# Patient Record
Sex: MALE | Race: WHITE | NOT HISPANIC OR LATINO | Employment: FULL TIME | URBAN - METROPOLITAN AREA
[De-identification: names, ages, dates, MRNs, and addresses within clinical notes are randomized per-mention and may not be internally consistent; named-entity substitution may affect disease eponyms.]

---

## 2017-03-10 PROBLEM — R13.14 DYSPHAGIA, PHARYNGOESOPHAGEAL: Status: ACTIVE | Noted: 2017-03-10

## 2022-02-11 PROBLEM — K61.1 PERIRECTAL ABSCESS: Status: ACTIVE | Noted: 2022-02-11

## 2022-04-18 PROBLEM — K60.3 PERIANAL FISTULA: Status: ACTIVE | Noted: 2022-04-18

## 2022-06-23 ENCOUNTER — TELEPHONE (OUTPATIENT)
Dept: SURGERY | Facility: CLINIC | Age: 62
End: 2022-06-23
Payer: COMMERCIAL

## 2022-06-24 ENCOUNTER — OFFICE VISIT (OUTPATIENT)
Dept: SURGERY | Facility: CLINIC | Age: 62
End: 2022-06-24
Payer: COMMERCIAL

## 2022-06-24 VITALS
SYSTOLIC BLOOD PRESSURE: 132 MMHG | BODY MASS INDEX: 30.97 KG/M2 | DIASTOLIC BLOOD PRESSURE: 76 MMHG | HEIGHT: 67 IN | WEIGHT: 197.31 LBS | HEART RATE: 68 BPM

## 2022-06-24 DIAGNOSIS — K60.3 ANAL FISTULA: Primary | ICD-10-CM

## 2022-06-24 PROCEDURE — 3008F BODY MASS INDEX DOCD: CPT | Mod: CPTII,S$GLB,, | Performed by: COLON & RECTAL SURGERY

## 2022-06-24 PROCEDURE — 3078F PR MOST RECENT DIASTOLIC BLOOD PRESSURE < 80 MM HG: ICD-10-PCS | Mod: CPTII,S$GLB,, | Performed by: COLON & RECTAL SURGERY

## 2022-06-24 PROCEDURE — 4010F ACE/ARB THERAPY RXD/TAKEN: CPT | Mod: CPTII,S$GLB,, | Performed by: COLON & RECTAL SURGERY

## 2022-06-24 PROCEDURE — 99204 PR OFFICE/OUTPT VISIT, NEW, LEVL IV, 45-59 MIN: ICD-10-PCS | Mod: S$GLB,,, | Performed by: COLON & RECTAL SURGERY

## 2022-06-24 PROCEDURE — 3075F PR MOST RECENT SYSTOLIC BLOOD PRESS GE 130-139MM HG: ICD-10-PCS | Mod: CPTII,S$GLB,, | Performed by: COLON & RECTAL SURGERY

## 2022-06-24 PROCEDURE — 1160F PR REVIEW ALL MEDS BY PRESCRIBER/CLIN PHARMACIST DOCUMENTED: ICD-10-PCS | Mod: CPTII,S$GLB,, | Performed by: COLON & RECTAL SURGERY

## 2022-06-24 PROCEDURE — 99999 PR PBB SHADOW E&M-EST. PATIENT-LVL V: ICD-10-PCS | Mod: PBBFAC,,, | Performed by: COLON & RECTAL SURGERY

## 2022-06-24 PROCEDURE — 3078F DIAST BP <80 MM HG: CPT | Mod: CPTII,S$GLB,, | Performed by: COLON & RECTAL SURGERY

## 2022-06-24 PROCEDURE — 1160F RVW MEDS BY RX/DR IN RCRD: CPT | Mod: CPTII,S$GLB,, | Performed by: COLON & RECTAL SURGERY

## 2022-06-24 PROCEDURE — 3075F SYST BP GE 130 - 139MM HG: CPT | Mod: CPTII,S$GLB,, | Performed by: COLON & RECTAL SURGERY

## 2022-06-24 PROCEDURE — 1159F MED LIST DOCD IN RCRD: CPT | Mod: CPTII,S$GLB,, | Performed by: COLON & RECTAL SURGERY

## 2022-06-24 PROCEDURE — 99204 OFFICE O/P NEW MOD 45 MIN: CPT | Mod: S$GLB,,, | Performed by: COLON & RECTAL SURGERY

## 2022-06-24 PROCEDURE — 4010F PR ACE/ARB THEARPY RXD/TAKEN: ICD-10-PCS | Mod: CPTII,S$GLB,, | Performed by: COLON & RECTAL SURGERY

## 2022-06-24 PROCEDURE — 1159F PR MEDICATION LIST DOCUMENTED IN MEDICAL RECORD: ICD-10-PCS | Mod: CPTII,S$GLB,, | Performed by: COLON & RECTAL SURGERY

## 2022-06-24 PROCEDURE — 3008F PR BODY MASS INDEX (BMI) DOCUMENTED: ICD-10-PCS | Mod: CPTII,S$GLB,, | Performed by: COLON & RECTAL SURGERY

## 2022-06-24 PROCEDURE — 99999 PR PBB SHADOW E&M-EST. PATIENT-LVL V: CPT | Mod: PBBFAC,,, | Performed by: COLON & RECTAL SURGERY

## 2022-06-24 RX ORDER — CIPROFLOXACIN 500 MG/1
500 TABLET ORAL 2 TIMES DAILY
Qty: 14 TABLET | Refills: 0 | Status: SHIPPED | OUTPATIENT
Start: 2022-06-24 | End: 2022-07-01

## 2022-06-24 RX ORDER — METRONIDAZOLE 500 MG/1
500 TABLET ORAL EVERY 8 HOURS
Qty: 21 TABLET | Refills: 0 | Status: SHIPPED | OUTPATIENT
Start: 2022-06-24 | End: 2022-07-01

## 2022-06-24 RX ORDER — OXYCODONE AND ACETAMINOPHEN 5; 325 MG/1; MG/1
1 TABLET ORAL EVERY 4 HOURS PRN
Qty: 20 TABLET | Refills: 0 | Status: ON HOLD | OUTPATIENT
Start: 2022-06-24 | End: 2022-12-21 | Stop reason: HOSPADM

## 2022-06-24 NOTE — PROGRESS NOTES
Subjective:       Patient ID: Jimmy Shaikh is a 61 y.o. male.    Chief Complaint: Anal Fistula    HPI New pt. S/p rectal mucosal advancement flap and 3 column Ligasure hemorrhoidecomy - May 25, 2022.   I and D Feb 2022, Seton April 2022.   In the past 2 weeks has had worsening pain and drainage. Csope last year - deiverticulosis.     Review of Systems   Constitutional: Negative for chills and fever.   Respiratory: Negative for cough and shortness of breath.    Cardiovascular: Negative for chest pain and palpitations.   Gastrointestinal: Negative for nausea and vomiting.   Genitourinary: Negative for dysuria and urgency.   Neurological: Negative for seizures and numbness.         Objective:      Physical Exam  Constitutional:       Appearance: He is well-developed.   Eyes:      Conjunctiva/sclera: Conjunctivae normal.   Pulmonary:      Effort: Pulmonary effort is normal. No respiratory distress.   Genitourinary:     Comments:   Left lateral external opening with palpable tract to posterior midline.  Tolerated KENISHA.    Musculoskeletal:         General: Normal range of motion.   Skin:     General: Skin is warm and dry.   Neurological:      Mental Status: He is alert and oriented to person, place, and time.         Reviewed CT and operative reports.         Assessment:       Problem List Items Addressed This Visit    None     Visit Diagnoses     Anal fistula    -  Primary    Relevant Medications    oxyCODONE-acetaminophen (PERCOCET) 5-325 mg per tablet    ciprofloxacin HCl (CIPRO) 500 MG tablet    metroNIDAZOLE (FLAGYL) 500 MG tablet    Other Relevant Orders    Case Request Operating Room: PLACEMENT, SETON STITCH (Completed)          Plan:       Exam under anesthesia. Seton Placement.  Oral antibiotics.   Plan will be for seton for extended time period.

## 2022-06-28 ENCOUNTER — ANESTHESIA EVENT (OUTPATIENT)
Dept: SURGERY | Facility: HOSPITAL | Age: 62
End: 2022-06-28
Payer: COMMERCIAL

## 2022-06-28 ENCOUNTER — TELEPHONE (OUTPATIENT)
Dept: SURGERY | Facility: CLINIC | Age: 62
End: 2022-06-28
Payer: COMMERCIAL

## 2022-06-28 NOTE — PRE-PROCEDURE INSTRUCTIONS
Preoperative NPO and Bowel Prep instructions given per CRS Dept. Patient's questions answered and patient V/C/U.    PREOP INSTRUCTIONS:    Shower instructions as well as directions to the Surgery Center were given.Encouraged to wear loose fitting,comfortable clothing.Medication instructions for pm prior to and am of procedure reviewed.Instructed to avoid taking vitamins,supplements,aspirin and ibuprofen the morning of surgery.     Patient advised of the updated visitor policy allowing one adult support person,pre and post procedure.     Patient denies any side effects or issues with anesthesia or sedation.      Patient given arrival time of 0600 to [facility]

## 2022-06-29 ENCOUNTER — TELEPHONE (OUTPATIENT)
Dept: SURGERY | Facility: CLINIC | Age: 62
End: 2022-06-29
Payer: COMMERCIAL

## 2022-06-29 ENCOUNTER — ANESTHESIA (OUTPATIENT)
Dept: SURGERY | Facility: HOSPITAL | Age: 62
End: 2022-06-29
Payer: COMMERCIAL

## 2022-06-29 ENCOUNTER — HOSPITAL ENCOUNTER (OUTPATIENT)
Facility: HOSPITAL | Age: 62
Discharge: HOME | End: 2022-06-29
Attending: COLON & RECTAL SURGERY | Admitting: COLON & RECTAL SURGERY
Payer: COMMERCIAL

## 2022-06-29 VITALS
TEMPERATURE: 98 F | SYSTOLIC BLOOD PRESSURE: 120 MMHG | HEART RATE: 68 BPM | OXYGEN SATURATION: 95 % | DIASTOLIC BLOOD PRESSURE: 78 MMHG | RESPIRATION RATE: 18 BRPM

## 2022-06-29 DIAGNOSIS — K60.3 ANAL FISTULA: Primary | ICD-10-CM

## 2022-06-29 LAB
CTP QC/QA: YES
SARS-COV-2 AG RESP QL IA.RAPID: NEGATIVE

## 2022-06-29 PROCEDURE — 37000009 HC ANESTHESIA EA ADD 15 MINS: Performed by: COLON & RECTAL SURGERY

## 2022-06-29 PROCEDURE — 46275 PR REMOVAL ANAL FISTULA,INTERSPNINCTERIC: ICD-10-PCS | Mod: ,,, | Performed by: COLON & RECTAL SURGERY

## 2022-06-29 PROCEDURE — 63600175 PHARM REV CODE 636 W HCPCS: Performed by: ANESTHESIOLOGY

## 2022-06-29 PROCEDURE — 25000003 PHARM REV CODE 250: Performed by: NURSE ANESTHETIST, CERTIFIED REGISTERED

## 2022-06-29 PROCEDURE — 37000008 HC ANESTHESIA 1ST 15 MINUTES: Performed by: COLON & RECTAL SURGERY

## 2022-06-29 PROCEDURE — 46275 REMOVE ANAL FIST INTER: CPT | Mod: ,,, | Performed by: COLON & RECTAL SURGERY

## 2022-06-29 PROCEDURE — 63600175 PHARM REV CODE 636 W HCPCS: Performed by: NURSE ANESTHETIST, CERTIFIED REGISTERED

## 2022-06-29 PROCEDURE — 71000033 HC RECOVERY, INTIAL HOUR: Performed by: COLON & RECTAL SURGERY

## 2022-06-29 PROCEDURE — 36000705 HC OR TIME LEV I EA ADD 15 MIN: Performed by: COLON & RECTAL SURGERY

## 2022-06-29 PROCEDURE — 71000016 HC POSTOP RECOV ADDL HR: Performed by: COLON & RECTAL SURGERY

## 2022-06-29 PROCEDURE — D9220A PRA ANESTHESIA: Mod: CRNA,,, | Performed by: NURSE ANESTHETIST, CERTIFIED REGISTERED

## 2022-06-29 PROCEDURE — D9220A PRA ANESTHESIA: Mod: ANES,,, | Performed by: ANESTHESIOLOGY

## 2022-06-29 PROCEDURE — 25000003 PHARM REV CODE 250: Performed by: NURSE PRACTITIONER

## 2022-06-29 PROCEDURE — 36000704 HC OR TIME LEV I 1ST 15 MIN: Performed by: COLON & RECTAL SURGERY

## 2022-06-29 PROCEDURE — 71000015 HC POSTOP RECOV 1ST HR: Performed by: COLON & RECTAL SURGERY

## 2022-06-29 PROCEDURE — D9220A PRA ANESTHESIA: ICD-10-PCS | Mod: ANES,,, | Performed by: ANESTHESIOLOGY

## 2022-06-29 PROCEDURE — D9220A PRA ANESTHESIA: ICD-10-PCS | Mod: CRNA,,, | Performed by: NURSE ANESTHETIST, CERTIFIED REGISTERED

## 2022-06-29 PROCEDURE — 46020 PLACEMENT OF SETON: CPT | Mod: 51,,, | Performed by: COLON & RECTAL SURGERY

## 2022-06-29 PROCEDURE — 25000003 PHARM REV CODE 250

## 2022-06-29 PROCEDURE — 46020 PR PLACEMENT,SETON: ICD-10-PCS | Mod: 51,,, | Performed by: COLON & RECTAL SURGERY

## 2022-06-29 RX ORDER — SODIUM CHLORIDE 9 MG/ML
INJECTION, SOLUTION INTRAVENOUS CONTINUOUS
Status: DISCONTINUED | OUTPATIENT
Start: 2022-06-29 | End: 2022-06-29 | Stop reason: HOSPADM

## 2022-06-29 RX ORDER — FENTANYL CITRATE 50 UG/ML
INJECTION, SOLUTION INTRAMUSCULAR; INTRAVENOUS
Status: DISCONTINUED | OUTPATIENT
Start: 2022-06-29 | End: 2022-06-29

## 2022-06-29 RX ORDER — PROPOFOL 10 MG/ML
VIAL (ML) INTRAVENOUS
Status: DISCONTINUED | OUTPATIENT
Start: 2022-06-29 | End: 2022-06-29

## 2022-06-29 RX ORDER — SODIUM CHLORIDE 0.9 % (FLUSH) 0.9 %
10 SYRINGE (ML) INJECTION
Status: DISCONTINUED | OUTPATIENT
Start: 2022-06-29 | End: 2022-06-29 | Stop reason: HOSPADM

## 2022-06-29 RX ORDER — HALOPERIDOL 5 MG/ML
0.5 INJECTION INTRAMUSCULAR EVERY 10 MIN PRN
Status: DISCONTINUED | OUTPATIENT
Start: 2022-06-29 | End: 2022-06-29 | Stop reason: HOSPADM

## 2022-06-29 RX ORDER — LIDOCAINE HYDROCHLORIDE 10 MG/ML
1 INJECTION, SOLUTION EPIDURAL; INFILTRATION; INTRACAUDAL; PERINEURAL ONCE
Status: COMPLETED | OUTPATIENT
Start: 2022-06-29 | End: 2022-06-29

## 2022-06-29 RX ORDER — DEXAMETHASONE SODIUM PHOSPHATE 4 MG/ML
INJECTION, SOLUTION INTRA-ARTICULAR; INTRALESIONAL; INTRAMUSCULAR; INTRAVENOUS; SOFT TISSUE
Status: DISCONTINUED | OUTPATIENT
Start: 2022-06-29 | End: 2022-06-29

## 2022-06-29 RX ORDER — POLYETHYLENE GLYCOL 3350 17 G/17G
17 POWDER, FOR SOLUTION ORAL NIGHTLY PRN
Qty: 510 G | Refills: 0 | Status: SHIPPED | OUTPATIENT
Start: 2022-06-29 | End: 2023-03-30

## 2022-06-29 RX ORDER — HYDROMORPHONE HYDROCHLORIDE 1 MG/ML
0.2 INJECTION, SOLUTION INTRAMUSCULAR; INTRAVENOUS; SUBCUTANEOUS EVERY 5 MIN PRN
Status: DISCONTINUED | OUTPATIENT
Start: 2022-06-29 | End: 2022-06-29 | Stop reason: HOSPADM

## 2022-06-29 RX ORDER — PSYLLIUM HUSK 3.4 G/5.8G
1 POWDER ORAL 2 TIMES DAILY
Qty: 60 PACKET | Refills: 0 | Status: ON HOLD | OUTPATIENT
Start: 2022-06-29 | End: 2022-12-21 | Stop reason: HOSPADM

## 2022-06-29 RX ORDER — OXYCODONE HYDROCHLORIDE 5 MG/1
10 TABLET ORAL EVERY 4 HOURS PRN
Status: DISCONTINUED | OUTPATIENT
Start: 2022-06-29 | End: 2022-06-29 | Stop reason: HOSPADM

## 2022-06-29 RX ORDER — LIDOCAINE HYDROCHLORIDE 20 MG/ML
INJECTION, SOLUTION EPIDURAL; INFILTRATION; INTRACAUDAL; PERINEURAL
Status: DISCONTINUED | OUTPATIENT
Start: 2022-06-29 | End: 2022-06-29

## 2022-06-29 RX ORDER — ONDANSETRON 2 MG/ML
INJECTION INTRAMUSCULAR; INTRAVENOUS
Status: DISCONTINUED | OUTPATIENT
Start: 2022-06-29 | End: 2022-06-29

## 2022-06-29 RX ORDER — ROCURONIUM BROMIDE 10 MG/ML
INJECTION, SOLUTION INTRAVENOUS
Status: DISCONTINUED | OUTPATIENT
Start: 2022-06-29 | End: 2022-06-29

## 2022-06-29 RX ORDER — OXYCODONE HYDROCHLORIDE 5 MG/1
5 TABLET ORAL EVERY 6 HOURS PRN
Qty: 30 TABLET | Refills: 0 | Status: ON HOLD | OUTPATIENT
Start: 2022-06-29 | End: 2022-12-21 | Stop reason: SDUPTHER

## 2022-06-29 RX ORDER — IBUPROFEN 800 MG/1
800 TABLET ORAL EVERY 8 HOURS
Qty: 60 TABLET | Refills: 1 | Status: SHIPPED | OUTPATIENT
Start: 2022-06-29 | End: 2022-07-22 | Stop reason: SDUPTHER

## 2022-06-29 RX ORDER — SODIUM CHLORIDE 9 MG/ML
INJECTION, SOLUTION INTRAVENOUS CONTINUOUS
Status: ACTIVE | OUTPATIENT
Start: 2022-06-29

## 2022-06-29 RX ORDER — GABAPENTIN 100 MG/1
200 CAPSULE ORAL EVERY 8 HOURS
Qty: 42 CAPSULE | Refills: 0 | Status: SHIPPED | OUTPATIENT
Start: 2022-06-29 | End: 2023-03-30

## 2022-06-29 RX ORDER — PROCHLORPERAZINE EDISYLATE 5 MG/ML
5 INJECTION INTRAMUSCULAR; INTRAVENOUS EVERY 30 MIN PRN
Status: DISCONTINUED | OUTPATIENT
Start: 2022-06-29 | End: 2022-06-29 | Stop reason: HOSPADM

## 2022-06-29 RX ORDER — DIPHENHYDRAMINE HYDROCHLORIDE 50 MG/ML
25 INJECTION INTRAMUSCULAR; INTRAVENOUS EVERY 6 HOURS PRN
Status: DISCONTINUED | OUTPATIENT
Start: 2022-06-29 | End: 2022-06-29 | Stop reason: HOSPADM

## 2022-06-29 RX ORDER — FENTANYL CITRATE 50 UG/ML
25 INJECTION, SOLUTION INTRAMUSCULAR; INTRAVENOUS EVERY 5 MIN PRN
Status: DISCONTINUED | OUTPATIENT
Start: 2022-06-29 | End: 2022-06-29 | Stop reason: HOSPADM

## 2022-06-29 RX ORDER — ACETAMINOPHEN 10 MG/ML
INJECTION, SOLUTION INTRAVENOUS
Status: DISCONTINUED | OUTPATIENT
Start: 2022-06-29 | End: 2022-06-29

## 2022-06-29 RX ORDER — MUPIROCIN 20 MG/G
OINTMENT TOPICAL
Status: DISPENSED | OUTPATIENT
Start: 2022-06-29

## 2022-06-29 RX ORDER — OXYCODONE HYDROCHLORIDE 5 MG/1
5 TABLET ORAL EVERY 4 HOURS PRN
Status: DISCONTINUED | OUTPATIENT
Start: 2022-06-29 | End: 2022-06-29 | Stop reason: HOSPADM

## 2022-06-29 RX ORDER — PHENYLEPHRINE HCL IN 0.9% NACL 1 MG/10 ML
SYRINGE (ML) INTRAVENOUS
Status: DISCONTINUED | OUTPATIENT
Start: 2022-06-29 | End: 2022-06-29

## 2022-06-29 RX ORDER — SODIUM CHLORIDE 0.9 % (FLUSH) 0.9 %
3 SYRINGE (ML) INJECTION
Status: DISCONTINUED | OUTPATIENT
Start: 2022-06-29 | End: 2022-06-29 | Stop reason: HOSPADM

## 2022-06-29 RX ORDER — ACETAMINOPHEN 500 MG
1000 TABLET ORAL EVERY 8 HOURS
Qty: 60 TABLET | Refills: 1 | Status: SHIPPED | OUTPATIENT
Start: 2022-06-29 | End: 2022-07-22 | Stop reason: SDUPTHER

## 2022-06-29 RX ORDER — HYDROMORPHONE HYDROCHLORIDE 2 MG/ML
INJECTION, SOLUTION INTRAMUSCULAR; INTRAVENOUS; SUBCUTANEOUS
Status: DISCONTINUED | OUTPATIENT
Start: 2022-06-29 | End: 2022-06-29

## 2022-06-29 RX ADMIN — SODIUM CHLORIDE, SODIUM GLUCONATE, SODIUM ACETATE, POTASSIUM CHLORIDE, MAGNESIUM CHLORIDE, SODIUM PHOSPHATE, DIBASIC, AND POTASSIUM PHOSPHATE: .53; .5; .37; .037; .03; .012; .00082 INJECTION, SOLUTION INTRAVENOUS at 09:06

## 2022-06-29 RX ADMIN — OXYCODONE 10 MG: 5 TABLET ORAL at 09:06

## 2022-06-29 RX ADMIN — HYDROMORPHONE HYDROCHLORIDE 1 MG: 2 INJECTION, SOLUTION INTRAMUSCULAR; INTRAVENOUS; SUBCUTANEOUS at 09:06

## 2022-06-29 RX ADMIN — SODIUM CHLORIDE: 0.9 INJECTION, SOLUTION INTRAVENOUS at 06:06

## 2022-06-29 RX ADMIN — Medication 100 MCG: at 08:06

## 2022-06-29 RX ADMIN — MUPIROCIN: 20 OINTMENT TOPICAL at 06:06

## 2022-06-29 RX ADMIN — MIDAZOLAM HYDROCHLORIDE 2 MG: 1 INJECTION, SOLUTION INTRAMUSCULAR; INTRAVENOUS at 07:06

## 2022-06-29 RX ADMIN — LIDOCAINE HYDROCHLORIDE 10 MG: 10 INJECTION, SOLUTION EPIDURAL; INFILTRATION; INTRACAUDAL at 07:06

## 2022-06-29 RX ADMIN — DEXAMETHASONE SODIUM PHOSPHATE 8 MG: 4 INJECTION INTRA-ARTICULAR; INTRALESIONAL; INTRAMUSCULAR; INTRAVENOUS; SOFT TISSUE at 08:06

## 2022-06-29 RX ADMIN — LIDOCAINE HYDROCHLORIDE 100 MG: 20 INJECTION, SOLUTION EPIDURAL; INFILTRATION; INTRACAUDAL at 08:06

## 2022-06-29 RX ADMIN — FENTANYL CITRATE 100 MCG: 50 INJECTION INTRAMUSCULAR; INTRAVENOUS at 08:06

## 2022-06-29 RX ADMIN — ONDANSETRON 4 MG: 2 INJECTION INTRAMUSCULAR; INTRAVENOUS at 08:06

## 2022-06-29 RX ADMIN — ROCURONIUM BROMIDE 50 MG: 10 INJECTION INTRAVENOUS at 08:06

## 2022-06-29 RX ADMIN — FENTANYL CITRATE 25 MCG: 50 INJECTION INTRAMUSCULAR; INTRAVENOUS at 10:06

## 2022-06-29 RX ADMIN — ACETAMINOPHEN 1000 MG: 10 INJECTION INTRAVENOUS at 08:06

## 2022-06-29 RX ADMIN — SUGAMMADEX 200 MG: 100 INJECTION, SOLUTION INTRAVENOUS at 08:06

## 2022-06-29 RX ADMIN — PROPOFOL 150 MG: 10 INJECTION, EMULSION INTRAVENOUS at 08:06

## 2022-06-29 NOTE — ANESTHESIA POSTPROCEDURE EVALUATION
Anesthesia Post Evaluation    Patient: Jimmy Shaikh    Procedure(s) Performed: Procedure(s) (LRB):  PLACEMENT, SETON STITCH (N/A)    Final Anesthesia Type: general      Patient location during evaluation: PACU  Patient participation: Yes- Able to Participate  Level of consciousness: awake and alert  Post-procedure vital signs: reviewed and stable  Pain management: adequate  Airway patency: patent    PONV status at discharge: No PONV  Anesthetic complications: no      Cardiovascular status: blood pressure returned to baseline  Respiratory status: unassisted  Follow-up not needed.          Vitals Value Taken Time   /72 06/29/22 1003   Temp 36.9 °C (98.4 °F) 06/29/22 0937   Pulse 67 06/29/22 1014   Resp 17 06/29/22 1000   SpO2 91 % 06/29/22 1014   Vitals shown include unvalidated device data.      Event Time   Out of Recovery 09:30:00         Pain/Amparo Score: Pain Rating Prior to Med Admin: 5 (6/29/2022 10:00 AM)  Amparo Score: 10 (6/29/2022  9:37 AM)

## 2022-06-29 NOTE — PLAN OF CARE
Patient is tolerating PO liquid without difficulty, he denies nausea and states his pain level is at a tolerable level for him. Patient walked to restroom and voided w/o difficulty. Awaiting RX.

## 2022-06-29 NOTE — TELEPHONE ENCOUNTER
----- Message from Karson Stevens sent at 6/29/2022  1:35 PM CDT -----  Contact: @ [phone]  Patient calling to get the return to work letter ( returning to work on Tuesday 7-5th ) uploaded via [employer] and update on the short term disability ( pt states his insurance company is waiting ) pls advise

## 2022-06-29 NOTE — ANESTHESIA PROCEDURE NOTES
Intubation    Date/Time: 6/29/2022 8:14 AM  Performed by: Oliva Prado CRNA  Authorized by: Jonn Everett MD     Intubation:     Induction:  Intravenous    Intubated:  Postinduction    Mask Ventilation:  Easy with oral airway    Attempts:  1    Attempted By:  Student    Method of Intubation:  Bougie    Blade:  Amos 3    Laryngeal View Grade: Grade IIA - cords partially seen      Difficult Airway Encountered?: No      Complications:  None    Airway Device:  Oral endotracheal tube    Airway Device Size:  7.5    Style/Cuff Inflation:  Cuffed    Inflation Amount (mL):  8    Tube secured:  25    Secured at:  The lips    Placement Verified By:  Capnometry    Complicating Factors:  Anterior larynx    Findings Post-Intubation:  BS equal bilateral

## 2022-06-29 NOTE — OP NOTE
JIMMY SHAIKH  [id number]  [id number]    OPERATIVE NOTE:    DATE OF OPERATION: 06/29/2022    PREOPERATIVE DIAGNOSIS: Anal fistula    POSTOPERATIVE DIAGNOSIS: 1) Anal fistula, suprasphincteric 2) Anal fistula, intersphincteric    PROCEDURE PERFORMED: 1) Exam under anesthesia with Seton placement. 2)Fistulotomy    ATTENDING SURGEON: Esteban Leung MD    RESIDENT/FELLOW SURGEON: Rakesh Scott MD    ANESTHESIA: GETA    ESTIMATED BLOOD LOSS: 25 mL    FINDINGS:  1. Left anterolateral external opening which tracked posterior eventually to the midline above the dentate line.  2. Posterior midline intersphincteric fistula.    SPECIMENS:   1. None    COMPLICATIONS:  None    DISPOSITION: PACU    CONDITION: good    INDICATION FOR PROCEDURE:  Jimmy Shaikh is a 61 y.o. male with a history of a anal abscess and had a mucosal advancement flap approximately 1 month ago.  He has ongoing perianal pain and drainage.    DESCRIPTION OF PROCEDURE:   After obtaining informed consent the patient was taken the operating room and placed in the supine position.  He underwent general endotracheal anesthesia and then was placed prone rosy-knife.  He was padded and secured appropriately for this position and then was prepped draped sterile manner.  A preoperative time-out was performed.    On exam there was an external opening left anterolateral with a palpable tract extending posterior.  There was a 2nd external opening in the posterior midline.  On palpation there were no masses.  Hydrogen peroxide was injected into the left anterolateral opening and helped with visualization of an internal opening in the posterior midline above the dentate line.  The posterior midline fistula was probed and was intersphincteric and course with the internal opening separate from the previously described internal opening and distal to the dentate line.  A simple lay open fistulotomy was performed with electrocautery.  Initially I could not  get a probe to traverse the entire length of the fistula starting left anterolateral.  For this reason we unroofed it over approximately 2 cm and found an abscess cavity in the ischiorectal space.  This shorten the course and off the we were then able to place a probe through this opening to the previously mentioned internal opening proximal the dentate line.  A seton it was placed in the form of a vessel loop which was tied on itself.  Hemostasis was ensured.  An anal block was performed with Marcaine.    The patient tolerated procedure well was taken recovery room in stable condition.  He is given discharge instructions for diet, activity, wound care and follow-up.      Esteban Leung MD  , Colon & Rectal Surgery

## 2022-06-29 NOTE — ANESTHESIA PREPROCEDURE EVALUATION
06/29/2022  Pre-operative evaluation for Procedure(s) (LRB):  PLACEMENT, SETON STITCH (N/A)    Jimmy Shaikh is a 61 y.o. male here for above    Patient Active Problem List   Diagnosis    Dysphagia, pharyngoesophageal    Perirectal abscess    Perianal fistula       Review of patient's allergies indicates:  No Known Allergies    Current Facility-Administered Medications on File Prior to Encounter   Medication Dose Route Frequency Provider Last Rate Last Admin    0.9%  NaCl infusion   Intravenous Continuous Romario Forbes MD   Stopped at 12/15/21 1055    lactated ringers infusion   Intravenous Continuous Willis Giron MD 0 mL/hr at 02/14/22 1130 New Bag at 05/25/22 1206     Current Outpatient Medications on File Prior to Encounter   Medication Sig Dispense Refill    ciprofloxacin HCl (CIPRO) 500 MG tablet Take 1 tablet (500 mg total) by mouth 2 (two) times daily. for 7 days 14 tablet 0    esomeprazole (NEXIUM) 20 MG capsule Take 20 mg by mouth every evening.      lisinopril 10 MG tablet Take 10 mg by mouth every evening.      metroNIDAZOLE (FLAGYL) 500 MG tablet Take 1 tablet (500 mg total) by mouth every 8 (eight) hours. for 7 days 21 tablet 0    pantoprazole (PROTONIX) 40 MG tablet Take 40 mg by mouth every evening.      aspirin (ECOTRIN) 81 MG EC tablet Take 81 mg by mouth once daily.      atorvastatin (LIPITOR) 20 MG tablet Take 20 mg by mouth every evening.      HYDROcodone-acetaminophen (NORCO)  mg per tablet Take 1 tablet by mouth every 4 (four) hours as needed for Pain. (Patient not taking: Reported on 6/28/2022) 30 tablet 0    oxyCODONE-acetaminophen (PERCOCET) 5-325 mg per tablet Take 1 tablet by mouth every 4 (four) hours as needed for Pain. 20 tablet 0       Past Surgical History:   Procedure Laterality Date    ANTERIOR CRUCIATE LIGAMENT REPAIR Left     BONE CYST  EXCISION      neck    CLUB FOOT RELEASE Bilateral     COLONOSCOPY N/A 03/10/2017    Procedure: COLONOSCOPY;  Surgeon: Romario Forbes MD;  Location: [facility] ENDO;  Service: Endoscopy;  Laterality: N/A;    COLONOSCOPY N/A 12/15/2021    Procedure: COLONOSCOPY;  Surgeon: Romario Forbes MD;  Location: [facility] ENDO;  Service: Endoscopy;  Laterality: N/A;    COSMETIC SURGERY      deviated septum    CYST REMOVAL      on neck    ESOPHAGOGASTRODUODENOSCOPY (EGD) WITH DILATION      EXAMINATION UNDER ANESTHESIA Left 2022    Procedure: Exam under anesthesia;  Surgeon: Casper Wolf MD;  Location: [facility] OR;  Service: General;  Laterality: Left;    EYE SURGERY      lasik    FACIAL RECONSTRUCTION Left     CHEEK BONE    INCISION AND DRAINAGE OF PERIANAL FISTULA Left 2022    Procedure: INCISION AND DRAINAGE, FISTULA, PERIANAL;  Surgeon: Casper Wolf MD;  Location: [facility] OR;  Service: General;  Laterality: Left;    INCISION AND DRAINAGE OF PERIRECTAL REGION N/A 2022    Procedure: INCISION AND DRAINAGE, PERIRECTAL REGION;  Surgeon: Casper Wolf MD;  Location: [facility] OR;  Service: General;  Laterality: N/A;  8:30 per MD    MANDIBLE FRACTURE SURGERY      REPAIR OF ANAL FISTULA N/A 2022    Procedure: CLOSURE, FISTULA, ANAL;  Surgeon: Casper Wolf MD;  Location: [facility] OR;  Service: General;  Laterality: N/A;  10am per MD    UPPER GASTROINTESTINAL ENDOSCOPY         Social History     Socioeconomic History    Marital status:    Tobacco Use    Smoking status: Former Smoker     Packs/day: 1.00     Years: 25.00     Pack years: 25.00     Quit date:      Years since quittin.4    Smokeless tobacco: Never Used    Tobacco comment: quit    Substance and Sexual Activity    Alcohol use: Yes     Comment: ocasionally    Drug use: No             Pre-op Assessment    I have reviewed the Patient Summary Reports.     I have reviewed the Nursing Notes. I have reviewed the NPO Status.       Review of Systems  Anesthesia Hx:  No problems with previous Anesthesia    Cardiovascular:   Hypertension    Hepatic/GI:   GERD        Physical Exam  General: Well nourished    Airway:  Mallampati: II   Mouth Opening: Normal  Tongue: Normal    Chest/Lungs:  Normal Respiratory Rate    Heart:  Rhythm: Regular Rhythm        Anesthesia Plan  Type of Anesthesia, risks & benefits discussed:    Anesthesia Type: Gen ETT, Gen Natural Airway  Intra-op Monitoring Plan: Standard ASA Monitors  Post Op Pain Control Plan: multimodal analgesia  Informed Consent: Informed consent signed with the Patient and all parties understand the risks and agree with anesthesia plan.  All questions answered.   ASA Score: 3    Ready For Surgery From Anesthesia Perspective.     .

## 2022-06-29 NOTE — BRIEF OP NOTE
[facility] - Surgery ([facility])  Brief Operative Note    Surgery Date: 6/29/2022     Surgeon(s) and Role:     * Esteban Leung MD - Primary     * Rakesh Scott MD - Resident - Assisting        Pre-op Diagnosis:  Anal fistula [K60.3]    Post-op Diagnosis:  Post-Op Diagnosis Codes:     * Anal fistula [K60.3]    Procedure(s) (LRB):  PLACEMENT, SETON STITCH (N/A)    Anesthesia: General    Operative Findings:   1. Posterior anal fistulotomy.   2. Left lateral external opening of more anterior fistula with purulent drainage, high transsphincteric fistula with seton placement    Estimated Blood Loss: 20cc         Specimens:   Specimen (24h ago, onward)            None            Discharge Note    OUTCOME: Patient tolerated treatment/procedure well without complication and is now ready for discharge.    DISPOSITION: Home or Self Care    FINAL DIAGNOSIS:  <principal problem not specified>    FOLLOWUP: In clinic    DISCHARGE INSTRUCTIONS:    Discharge Procedure Orders   Diet general   Order Comments: You have no diet restrictions from surgery. Recommend drinking 8-10 glasses of fluids a day and consuming a high fiber diet with fruits, vegetables, beans, and whole grains.     Wound care routine (specify)   Order Comments: A small amount of drainage is normal. This may be blood or blood tinged, it will improve with time. Recommend using a pad or dry guaze for any drainage. This may be changed as needed for bathing or saturation.     You may have packing within the anus; this will pass with your first bowel movement. Keep wound clean after bowel movements. May shower or use sitz baths (sit in a tub with 2-3 inches of warm water for 10-15 minutes) as needed.     Do not apply creams or ointments unless instructed to. Do not put anything in your rectum, such as suppositories or enemas.     Call MD for:  severe uncontrolled pain   Order Comments: Anal surgery can be very painful. It is normal and very common. It is important to  take medication as prescribed to prevent your pain from being uncontrolled.      For Pain:   Non-narcotic pain medication has been prescribed and should be taken for baseline pain control. This includes medication like Acetaminophen (Tylenol), Ibuprofen (Motrin), and Gabapentin (Neurontin). These medications are effective and without the side effects of constipation or addiction. All three medications can be taken together, but we recommend taking Acetaminophen (Tylenol) and Ibuprofen (Motrin) at different times (for example 4 hours apart).     Narcotic pain medication has also been prescribed for breakthrough pain control. This includes medication like Oxycodone (OxyContin) or Hydromorphone (Dilaudid), which can be taken every 4-6 hours. Narcotic medication is safe for short periods of time but can cause drowsiness - do not drive or operate heavy machinery that may injury you or others while using.     Ibuprofen, Oxycodone, and Hydromorphone should be taken with food.     You have also been prescribed a medication (such as Psyllium (Metamucil) and/or Polyethylene Glycol (Miralax) to prevent constipation, which is common after anal surgery. This can also be worsened by narcotic pain medication. Please take as prescribed. Do not strain or sit on the toilet for long periods of time. Pain with and after bowel movements is normal.     Activity as tolerated   Order Comments: You have no restrictions. Walking and normal activity is strongly recommended. This can also help to prevent constipation.

## 2022-06-30 RX ORDER — MIDAZOLAM HYDROCHLORIDE 1 MG/ML
INJECTION, SOLUTION INTRAMUSCULAR; INTRAVENOUS
Status: DISCONTINUED | OUTPATIENT
Start: 2022-06-29 | End: 2022-06-30

## 2022-06-30 NOTE — ADDENDUM NOTE
Addendum  created 06/30/22 1426 by Oliva Prado, CRNA    Intraprocedure Meds edited, Orders acknowledged in Narrator

## 2022-07-06 ENCOUNTER — PATIENT MESSAGE (OUTPATIENT)
Dept: SURGERY | Facility: CLINIC | Age: 62
End: 2022-07-06
Payer: COMMERCIAL

## 2022-07-22 ENCOUNTER — OFFICE VISIT (OUTPATIENT)
Dept: SURGERY | Facility: CLINIC | Age: 62
End: 2022-07-22
Payer: COMMERCIAL

## 2022-07-22 VITALS
HEIGHT: 67 IN | WEIGHT: 198 LBS | HEART RATE: 78 BPM | SYSTOLIC BLOOD PRESSURE: 130 MMHG | BODY MASS INDEX: 31.08 KG/M2 | DIASTOLIC BLOOD PRESSURE: 74 MMHG

## 2022-07-22 DIAGNOSIS — K60.3 ANAL FISTULA: Primary | ICD-10-CM

## 2022-07-22 PROCEDURE — 99999 PR PBB SHADOW E&M-EST. PATIENT-LVL III: ICD-10-PCS | Mod: PBBFAC,,, | Performed by: COLON & RECTAL SURGERY

## 2022-07-22 PROCEDURE — 1160F RVW MEDS BY RX/DR IN RCRD: CPT | Mod: CPTII,S$GLB,, | Performed by: COLON & RECTAL SURGERY

## 2022-07-22 PROCEDURE — 4010F ACE/ARB THERAPY RXD/TAKEN: CPT | Mod: CPTII,S$GLB,, | Performed by: COLON & RECTAL SURGERY

## 2022-07-22 PROCEDURE — 4010F PR ACE/ARB THEARPY RXD/TAKEN: ICD-10-PCS | Mod: CPTII,S$GLB,, | Performed by: COLON & RECTAL SURGERY

## 2022-07-22 PROCEDURE — 1159F MED LIST DOCD IN RCRD: CPT | Mod: CPTII,S$GLB,, | Performed by: COLON & RECTAL SURGERY

## 2022-07-22 PROCEDURE — 99024 PR POST-OP FOLLOW-UP VISIT: ICD-10-PCS | Mod: S$GLB,,, | Performed by: COLON & RECTAL SURGERY

## 2022-07-22 PROCEDURE — 3008F BODY MASS INDEX DOCD: CPT | Mod: CPTII,S$GLB,, | Performed by: COLON & RECTAL SURGERY

## 2022-07-22 PROCEDURE — 1159F PR MEDICATION LIST DOCUMENTED IN MEDICAL RECORD: ICD-10-PCS | Mod: CPTII,S$GLB,, | Performed by: COLON & RECTAL SURGERY

## 2022-07-22 PROCEDURE — 3075F SYST BP GE 130 - 139MM HG: CPT | Mod: CPTII,S$GLB,, | Performed by: COLON & RECTAL SURGERY

## 2022-07-22 PROCEDURE — 1160F PR REVIEW ALL MEDS BY PRESCRIBER/CLIN PHARMACIST DOCUMENTED: ICD-10-PCS | Mod: CPTII,S$GLB,, | Performed by: COLON & RECTAL SURGERY

## 2022-07-22 PROCEDURE — 3008F PR BODY MASS INDEX (BMI) DOCUMENTED: ICD-10-PCS | Mod: CPTII,S$GLB,, | Performed by: COLON & RECTAL SURGERY

## 2022-07-22 PROCEDURE — 3078F DIAST BP <80 MM HG: CPT | Mod: CPTII,S$GLB,, | Performed by: COLON & RECTAL SURGERY

## 2022-07-22 PROCEDURE — 3078F PR MOST RECENT DIASTOLIC BLOOD PRESSURE < 80 MM HG: ICD-10-PCS | Mod: CPTII,S$GLB,, | Performed by: COLON & RECTAL SURGERY

## 2022-07-22 PROCEDURE — 3075F PR MOST RECENT SYSTOLIC BLOOD PRESS GE 130-139MM HG: ICD-10-PCS | Mod: CPTII,S$GLB,, | Performed by: COLON & RECTAL SURGERY

## 2022-07-22 PROCEDURE — 99999 PR PBB SHADOW E&M-EST. PATIENT-LVL III: CPT | Mod: PBBFAC,,, | Performed by: COLON & RECTAL SURGERY

## 2022-07-22 PROCEDURE — 99024 POSTOP FOLLOW-UP VISIT: CPT | Mod: S$GLB,,, | Performed by: COLON & RECTAL SURGERY

## 2022-07-22 RX ORDER — IBUPROFEN 800 MG/1
800 TABLET ORAL EVERY 8 HOURS
Qty: 60 TABLET | Refills: 2 | Status: SHIPPED | OUTPATIENT
Start: 2022-07-22 | End: 2023-02-23

## 2022-07-22 RX ORDER — ACETAMINOPHEN 500 MG
1000 TABLET ORAL EVERY 8 HOURS
Qty: 60 TABLET | Refills: 2 | Status: SHIPPED | OUTPATIENT
Start: 2022-07-22 | End: 2023-01-19 | Stop reason: SDUPTHER

## 2022-07-22 NOTE — PROGRESS NOTES
Subjective:       Patient ID: Jimmy Shaikh is a 61 y.o. male.    Chief Complaint: Post-op Evaluation    HPI Established pt. S/p EUA/ seton placement.  Pain has improved.  Is having some drainage.     Review of Systems   Constitutional: Negative for chills and fever.   Respiratory: Negative for cough and shortness of breath.    Cardiovascular: Negative for chest pain and palpitations.   Gastrointestinal: Negative for nausea and vomiting.   Genitourinary: Negative for dysuria and urgency.   Neurological: Negative for seizures and numbness.         Objective:      Physical Exam  Constitutional:       Appearance: He is well-developed.   Eyes:      Conjunctiva/sclera: Conjunctivae normal.   Pulmonary:      Effort: Pulmonary effort is normal. No respiratory distress.   Genitourinary:     Comments: Seton in place. No cellulitis.   Musculoskeletal:         General: Normal range of motion.   Skin:     General: Skin is warm and dry.   Neurological:      Mental Status: He is alert and oriented to person, place, and time.         Assessment:       Problem List Items Addressed This Visit    None     Visit Diagnoses     Anal fistula    -  Primary          Plan:       F/u October.  Will discuss definitive repair at that time.  Consider MRI prior to surgery.

## 2022-09-27 ENCOUNTER — TELEPHONE (OUTPATIENT)
Dept: SURGERY | Facility: CLINIC | Age: 62
End: 2022-09-27
Payer: COMMERCIAL

## 2022-10-03 ENCOUNTER — PATIENT MESSAGE (OUTPATIENT)
Dept: SURGERY | Facility: CLINIC | Age: 62
End: 2022-10-03
Payer: COMMERCIAL

## 2022-10-12 ENCOUNTER — TELEPHONE (OUTPATIENT)
Dept: SURGERY | Facility: CLINIC | Age: 62
End: 2022-10-12
Payer: COMMERCIAL

## 2022-10-13 ENCOUNTER — OFFICE VISIT (OUTPATIENT)
Dept: SURGERY | Facility: CLINIC | Age: 62
End: 2022-10-13
Payer: COMMERCIAL

## 2022-10-13 VITALS
HEART RATE: 55 BPM | HEIGHT: 67 IN | BODY MASS INDEX: 30.9 KG/M2 | SYSTOLIC BLOOD PRESSURE: 129 MMHG | DIASTOLIC BLOOD PRESSURE: 76 MMHG | WEIGHT: 196.88 LBS

## 2022-10-13 DIAGNOSIS — K60.3 PERIANAL FISTULA: Primary | ICD-10-CM

## 2022-10-13 PROCEDURE — 3074F SYST BP LT 130 MM HG: CPT | Mod: CPTII,S$GLB,, | Performed by: COLON & RECTAL SURGERY

## 2022-10-13 PROCEDURE — 3074F PR MOST RECENT SYSTOLIC BLOOD PRESSURE < 130 MM HG: ICD-10-PCS | Mod: CPTII,S$GLB,, | Performed by: COLON & RECTAL SURGERY

## 2022-10-13 PROCEDURE — 99999 PR PBB SHADOW E&M-EST. PATIENT-LVL IV: ICD-10-PCS | Mod: PBBFAC,,, | Performed by: COLON & RECTAL SURGERY

## 2022-10-13 PROCEDURE — 1160F PR REVIEW ALL MEDS BY PRESCRIBER/CLIN PHARMACIST DOCUMENTED: ICD-10-PCS | Mod: CPTII,S$GLB,, | Performed by: COLON & RECTAL SURGERY

## 2022-10-13 PROCEDURE — 1159F MED LIST DOCD IN RCRD: CPT | Mod: CPTII,S$GLB,, | Performed by: COLON & RECTAL SURGERY

## 2022-10-13 PROCEDURE — 3078F DIAST BP <80 MM HG: CPT | Mod: CPTII,S$GLB,, | Performed by: COLON & RECTAL SURGERY

## 2022-10-13 PROCEDURE — 4010F PR ACE/ARB THEARPY RXD/TAKEN: ICD-10-PCS | Mod: CPTII,S$GLB,, | Performed by: COLON & RECTAL SURGERY

## 2022-10-13 PROCEDURE — 99999 PR PBB SHADOW E&M-EST. PATIENT-LVL IV: CPT | Mod: PBBFAC,,, | Performed by: COLON & RECTAL SURGERY

## 2022-10-13 PROCEDURE — 99214 OFFICE O/P EST MOD 30 MIN: CPT | Mod: S$GLB,,, | Performed by: COLON & RECTAL SURGERY

## 2022-10-13 PROCEDURE — 1160F RVW MEDS BY RX/DR IN RCRD: CPT | Mod: CPTII,S$GLB,, | Performed by: COLON & RECTAL SURGERY

## 2022-10-13 PROCEDURE — 99214 PR OFFICE/OUTPT VISIT, EST, LEVL IV, 30-39 MIN: ICD-10-PCS | Mod: S$GLB,,, | Performed by: COLON & RECTAL SURGERY

## 2022-10-13 PROCEDURE — 3078F PR MOST RECENT DIASTOLIC BLOOD PRESSURE < 80 MM HG: ICD-10-PCS | Mod: CPTII,S$GLB,, | Performed by: COLON & RECTAL SURGERY

## 2022-10-13 PROCEDURE — 1159F PR MEDICATION LIST DOCUMENTED IN MEDICAL RECORD: ICD-10-PCS | Mod: CPTII,S$GLB,, | Performed by: COLON & RECTAL SURGERY

## 2022-10-13 PROCEDURE — 4010F ACE/ARB THERAPY RXD/TAKEN: CPT | Mod: CPTII,S$GLB,, | Performed by: COLON & RECTAL SURGERY

## 2022-10-13 NOTE — PROGRESS NOTES
"Subjective:       Patient ID: Jimmy Shaikh is a 61 y.o. male.    Chief Complaint: Complex anal fistula    HPI Established pt.     Prior anorectal surgical history:  [facility] (Dr. David Wolf)   - 2/14/22 EUA + I&D abscess: 9x7cm perianal abscess extending into left buttock, 2 vessels loops placed via counterincisions for drainage   - 4/21/22 EUA + seton placement: Perianal fistula with external opening at 2:00, internal opening posterior midline at dentate. Draining silk seton placed.   - 5/25/22 EUA + hemorrhoidectomy + rectal mucosal advancement flap: 3 column internal hemorrhoidectomy with Ligasure, rectal mucosal advancement flap used to cover the posterior midline fistula, fistula tract packed with 1/2" iodoform gauze    [facility] (Madhu)   - 6/29/22 EUA + fistulotomy + seton placement: left anterolateral external opening which tracked posterior eventually to the midline above the dentate line, posterior midline intersphincteric fistula.    Patient returns today for ongoing follow up. Notes persistent drainage/seepage, intermittently bloody, requiring daily use of pad. Occasional pain/irritation with bowel movements, taking metamucil and miralax for soft stools 2-3x/d, denies straining or tissue prolapse. Denies fevers/chills/sweats or constant perianal pain. Last colonoscopy 2021 at [place], per patient negative except for diverticulosis.    Review of Systems   Constitutional:  Negative for chills and fever.   Respiratory:  Negative for cough and shortness of breath.    Cardiovascular:  Negative for chest pain and palpitations.   Gastrointestinal:  Negative for nausea and vomiting.   Genitourinary:  Negative for dysuria and urgency.   Neurological:  Negative for seizures and numbness.       Objective:      Physical Exam  Constitutional:       Appearance: He is well-developed.   Eyes:      Conjunctiva/sclera: Conjunctivae normal.   Pulmonary:      Effort: Pulmonary effort is normal. No respiratory " distress.   Genitourinary:     Comments: Seton in place with mild induration at external opening, no associated fluctuance, erythema/cellulitis or purulent drainage upon palpation.  Musculoskeletal:         General: Normal range of motion.   Skin:     General: Skin is warm and dry.   Neurological:      Mental Status: He is alert and oriented to person, place, and time.       Assessment:       Problem List Items Addressed This Visit          GI    Perianal fistula - Primary    Relevant Orders    MRI Anal Sphincter W W/O Contrast       Plan:       MRI Anal Sphincter W/WO Contrast scheduled to evaluate complex sphincter anatomy to help guide management  Discussed both operative and nonoperative options for management of complex perianal fistula disease as well associated risk/morbidity of each - will likely plan for Kerecis injection as next step given prior fistulotomy and failed rectal mucosal advancement flap  Will call patient to discuss results of MRI upon completion to to finalize next steps    The patient was evaluated with the attending surgeon, Dr. Leung.    Ty Castillo MD  Colon and Rectal Surgery Fellow

## 2022-12-13 ENCOUNTER — HOSPITAL ENCOUNTER (OUTPATIENT)
Dept: RADIOLOGY | Facility: HOSPITAL | Age: 62
Discharge: HOME | End: 2022-12-13
Attending: STUDENT IN AN ORGANIZED HEALTH CARE EDUCATION/TRAINING PROGRAM
Payer: COMMERCIAL

## 2022-12-13 DIAGNOSIS — K60.3 PERIANAL FISTULA: ICD-10-CM

## 2022-12-13 PROCEDURE — 72197 MRI PELVIS W/O & W/DYE: CPT | Mod: 26,,, | Performed by: STUDENT IN AN ORGANIZED HEALTH CARE EDUCATION/TRAINING PROGRAM

## 2022-12-13 PROCEDURE — 25500020 PHARM REV CODE 255: Performed by: STUDENT IN AN ORGANIZED HEALTH CARE EDUCATION/TRAINING PROGRAM

## 2022-12-13 PROCEDURE — 72197 MRI ANAL SPHINCTER W W/O CONTRAST: ICD-10-PCS | Mod: 26,,, | Performed by: STUDENT IN AN ORGANIZED HEALTH CARE EDUCATION/TRAINING PROGRAM

## 2022-12-13 PROCEDURE — A9585 GADOBUTROL INJECTION: HCPCS | Performed by: STUDENT IN AN ORGANIZED HEALTH CARE EDUCATION/TRAINING PROGRAM

## 2022-12-13 PROCEDURE — 72197 MRI PELVIS W/O & W/DYE: CPT | Mod: TC

## 2022-12-13 RX ORDER — GADOBUTROL 604.72 MG/ML
10 INJECTION INTRAVENOUS
Status: COMPLETED | OUTPATIENT
Start: 2022-12-13 | End: 2022-12-13

## 2022-12-13 RX ADMIN — GADOBUTROL 10 ML: 604.72 INJECTION INTRAVENOUS at 07:12

## 2022-12-15 ENCOUNTER — OFFICE VISIT (OUTPATIENT)
Dept: SURGERY | Facility: CLINIC | Age: 62
End: 2022-12-15
Payer: COMMERCIAL

## 2022-12-15 VITALS
SYSTOLIC BLOOD PRESSURE: 124 MMHG | BODY MASS INDEX: 31.44 KG/M2 | HEART RATE: 57 BPM | DIASTOLIC BLOOD PRESSURE: 76 MMHG | HEIGHT: 67 IN | WEIGHT: 200.31 LBS

## 2022-12-15 DIAGNOSIS — K60.3 PERIANAL FISTULA: Primary | ICD-10-CM

## 2022-12-15 PROCEDURE — 99999 PR PBB SHADOW E&M-EST. PATIENT-LVL III: CPT | Mod: PBBFAC,,, | Performed by: COLON & RECTAL SURGERY

## 2022-12-15 PROCEDURE — 1159F MED LIST DOCD IN RCRD: CPT | Mod: CPTII,S$GLB,, | Performed by: COLON & RECTAL SURGERY

## 2022-12-15 PROCEDURE — 4010F ACE/ARB THERAPY RXD/TAKEN: CPT | Mod: CPTII,S$GLB,, | Performed by: COLON & RECTAL SURGERY

## 2022-12-15 PROCEDURE — 4010F PR ACE/ARB THEARPY RXD/TAKEN: ICD-10-PCS | Mod: CPTII,S$GLB,, | Performed by: COLON & RECTAL SURGERY

## 2022-12-15 PROCEDURE — 1159F PR MEDICATION LIST DOCUMENTED IN MEDICAL RECORD: ICD-10-PCS | Mod: CPTII,S$GLB,, | Performed by: COLON & RECTAL SURGERY

## 2022-12-15 PROCEDURE — 3008F PR BODY MASS INDEX (BMI) DOCUMENTED: ICD-10-PCS | Mod: CPTII,S$GLB,, | Performed by: COLON & RECTAL SURGERY

## 2022-12-15 PROCEDURE — 3078F DIAST BP <80 MM HG: CPT | Mod: CPTII,S$GLB,, | Performed by: COLON & RECTAL SURGERY

## 2022-12-15 PROCEDURE — 99214 PR OFFICE/OUTPT VISIT, EST, LEVL IV, 30-39 MIN: ICD-10-PCS | Mod: S$GLB,,, | Performed by: COLON & RECTAL SURGERY

## 2022-12-15 PROCEDURE — 3074F SYST BP LT 130 MM HG: CPT | Mod: CPTII,S$GLB,, | Performed by: COLON & RECTAL SURGERY

## 2022-12-15 PROCEDURE — 3074F PR MOST RECENT SYSTOLIC BLOOD PRESSURE < 130 MM HG: ICD-10-PCS | Mod: CPTII,S$GLB,, | Performed by: COLON & RECTAL SURGERY

## 2022-12-15 PROCEDURE — 1160F PR REVIEW ALL MEDS BY PRESCRIBER/CLIN PHARMACIST DOCUMENTED: ICD-10-PCS | Mod: CPTII,S$GLB,, | Performed by: COLON & RECTAL SURGERY

## 2022-12-15 PROCEDURE — 99214 OFFICE O/P EST MOD 30 MIN: CPT | Mod: S$GLB,,, | Performed by: COLON & RECTAL SURGERY

## 2022-12-15 PROCEDURE — 99999 PR PBB SHADOW E&M-EST. PATIENT-LVL III: ICD-10-PCS | Mod: PBBFAC,,, | Performed by: COLON & RECTAL SURGERY

## 2022-12-15 PROCEDURE — 3008F BODY MASS INDEX DOCD: CPT | Mod: CPTII,S$GLB,, | Performed by: COLON & RECTAL SURGERY

## 2022-12-15 PROCEDURE — 1160F RVW MEDS BY RX/DR IN RCRD: CPT | Mod: CPTII,S$GLB,, | Performed by: COLON & RECTAL SURGERY

## 2022-12-15 PROCEDURE — 3078F PR MOST RECENT DIASTOLIC BLOOD PRESSURE < 80 MM HG: ICD-10-PCS | Mod: CPTII,S$GLB,, | Performed by: COLON & RECTAL SURGERY

## 2022-12-15 NOTE — H&P (VIEW-ONLY)
"  CRS Office Visit Follow-up  Referring Md:   No referring provider defined for this encounter.    SUBJECTIVE:     Chief Complaint: Anorectal fistula    History of Present Illness:  Patient is a 62 y.o. male presents with Anorectal fistula. The patient is a established patient to this practice.     Prior anorectal surgical history:  [facility] (Dr. David Wolf)   - 2/14/22 EUA + I&D abscess: 9x7cm perianal abscess extending into left buttock, 2 vessels loops placed via counterincisions for drainage   - 4/21/22 EUA + seton placement: Perianal fistula with external opening at 2:00, internal opening posterior midline at dentate. Draining silk seton placed.   - 5/25/22 EUA + hemorrhoidectomy + rectal mucosal advancement flap: 3 column internal hemorrhoidectomy with Ligasure, rectal mucosal advancement flap used to cover the posterior midline fistula, fistula tract packed with 1/2" iodoform gauze     [facility] ([facility])   - 6/29/22 EUA + fistulotomy + seton placement: left anterolateral external opening which tracked posterior eventually to the midline above the dentate line, posterior midline intersphincteric fistula.     10/13/22:  Patient returns today for ongoing follow up. Notes persistent drainage/seepage, intermittently bloody, requiring daily use of pad. Occasional pain/irritation with bowel movements, taking metamucil and miralax for soft stools 2-3x/d, denies straining or tissue prolapse. Denies fevers/chills/sweats or constant perianal pain.     Interval history (12/15/22):  Patient returns for follow up. Continues to have persistent drainage which is intermittently bloody. Has been taking metamucil and miralax daily, with regular well formed bowel movements. Denies fevers/chills.     MRI (12/13/22):  Transsphincteric seton containing perianal fistula with left-sided blind-ending pocket and dominant fistulous tract exiting at the left gluteal cleft.  Both tract and blind-ending pocket demonstrate signal " "characteristics suggestive for granulation tissue.  No residual abscess.    Last Colonoscopy: 2021 at [place], per patient negative except for diverticulosis.    Review of Systems:  ROS    OBJECTIVE:     Vital Signs (Most Recent)  /76 (BP Location: Left arm, Patient Position: Sitting, BP Method: Small (Automatic))   Pulse (!) 57   Ht 5' 7" (1.702 m)   Wt 90.9 kg (200 lb 4.8 oz)   BMI 31.37 kg/m²     Physical Exam:  General: White male in no distress   Neuro: alert and oriented x 4.  Moves all extremities.     HEENT: no icterus.  Trachea midline  Respiratory: respirations are even and unlabored  Cardiac: regular rate  Abdomen: Soft, NT/ND  Extremities: Warm dry and intact  Skin: no rashes      ASSESSMENT/PLAN:     There are no diagnoses linked to this encounter.    62 y.o. male with anorectal fistula    - Discussed MRI results indicating complex anorectal fistula  - Will plan for EUA, kerecis injection, possible LIFT, possible fistulotomy   - Will call patient when OR time becomes available    The patient was evaluated with the attending surgeon, Dr. Leung.     "

## 2022-12-15 NOTE — PROGRESS NOTES
"  CRS Office Visit Follow-up  Referring Md:   No referring provider defined for this encounter.    SUBJECTIVE:     Chief Complaint: Anorectal fistula    History of Present Illness:  Patient is a 62 y.o. male presents with Anorectal fistula. The patient is a established patient to this practice.     Prior anorectal surgical history:  [facility] (Dr. David oWlf)   - 2/14/22 EUA + I&D abscess: 9x7cm perianal abscess extending into left buttock, 2 vessels loops placed via counterincisions for drainage   - 4/21/22 EUA + seton placement: Perianal fistula with external opening at 2:00, internal opening posterior midline at dentate. Draining silk seton placed.   - 5/25/22 EUA + hemorrhoidectomy + rectal mucosal advancement flap: 3 column internal hemorrhoidectomy with Ligasure, rectal mucosal advancement flap used to cover the posterior midline fistula, fistula tract packed with 1/2" iodoform gauze     [facility] (Madhu)   - 6/29/22 EUA + fistulotomy + seton placement: left anterolateral external opening which tracked posterior eventually to the midline above the dentate line, posterior midline intersphincteric fistula.     10/13/22:  Patient returns today for ongoing follow up. Notes persistent drainage/seepage, intermittently bloody, requiring daily use of pad. Occasional pain/irritation with bowel movements, taking metamucil and miralax for soft stools 2-3x/d, denies straining or tissue prolapse. Denies fevers/chills/sweats or constant perianal pain.     Interval history (12/15/22):  Patient returns for follow up. Continues to have persistent drainage which is intermittently bloody. Has been taking metamucil and miralax daily, with regular well formed bowel movements. Denies fevers/chills.     MRI (12/13/22):  Transsphincteric seton containing perianal fistula with left-sided blind-ending pocket and dominant fistulous tract exiting at the left gluteal cleft.  Both tract and blind-ending pocket demonstrate signal " "characteristics suggestive for granulation tissue.  No residual abscess.    Last Colonoscopy: 2021 at [place], per patient negative except for diverticulosis.    Review of Systems:  ROS    OBJECTIVE:     Vital Signs (Most Recent)  /76 (BP Location: Left arm, Patient Position: Sitting, BP Method: Small (Automatic))   Pulse (!) 57   Ht 5' 7" (1.702 m)   Wt 90.9 kg (200 lb 4.8 oz)   BMI 31.37 kg/m²     Physical Exam:  General: White male in no distress   Neuro: alert and oriented x 4.  Moves all extremities.     HEENT: no icterus.  Trachea midline  Respiratory: respirations are even and unlabored  Cardiac: regular rate  Abdomen: Soft, NT/ND  Extremities: Warm dry and intact  Skin: no rashes      ASSESSMENT/PLAN:     There are no diagnoses linked to this encounter.    62 y.o. male with anorectal fistula    - Discussed MRI results indicating complex anorectal fistula  - Will plan for EUA, kerecis injection, possible LIFT, possible fistulotomy   - Will call patient when OR time becomes available    The patient was evaluated with the attending surgeon, Dr. Leung.     "

## 2022-12-16 ENCOUNTER — TELEPHONE (OUTPATIENT)
Dept: SURGERY | Facility: CLINIC | Age: 62
End: 2022-12-16
Payer: COMMERCIAL

## 2022-12-18 ENCOUNTER — PATIENT MESSAGE (OUTPATIENT)
Dept: SURGERY | Facility: HOSPITAL | Age: 62
End: 2022-12-18
Payer: COMMERCIAL

## 2022-12-19 ENCOUNTER — PATIENT MESSAGE (OUTPATIENT)
Dept: SURGERY | Facility: CLINIC | Age: 62
End: 2022-12-19
Payer: COMMERCIAL

## 2022-12-20 ENCOUNTER — TELEPHONE (OUTPATIENT)
Dept: SURGERY | Facility: CLINIC | Age: 62
End: 2022-12-20
Payer: COMMERCIAL

## 2022-12-20 NOTE — PRE-PROCEDURE INSTRUCTIONS
Preoperative NPO and Bowel Prep instructions given per CRS Dept. Patient's questions answered and patient V/C/U.    PREOP INSTRUCTIONS:    Shower instructions as well as directions to the Surgery Center were given.Encouraged to wear loose fitting,comfortable clothing.Medication instructions for pm prior to and am of procedure reviewed.Instructed to avoid taking vitamins,supplements,aspirin and ibuprofen the morning of surgery.     Patient denies any side effects or issues with anesthesia or sedation.      Patient  - 1030 arrival to [facility]

## 2022-12-21 ENCOUNTER — HOSPITAL ENCOUNTER (OUTPATIENT)
Facility: HOSPITAL | Age: 62
Discharge: HOME | End: 2022-12-21
Attending: COLON & RECTAL SURGERY | Admitting: COLON & RECTAL SURGERY
Payer: COMMERCIAL

## 2022-12-21 ENCOUNTER — ANESTHESIA EVENT (OUTPATIENT)
Dept: SURGERY | Facility: HOSPITAL | Age: 62
End: 2022-12-21
Payer: COMMERCIAL

## 2022-12-21 ENCOUNTER — PATIENT MESSAGE (OUTPATIENT)
Dept: SURGERY | Facility: HOSPITAL | Age: 62
End: 2022-12-21
Payer: COMMERCIAL

## 2022-12-21 ENCOUNTER — ANESTHESIA (OUTPATIENT)
Dept: SURGERY | Facility: HOSPITAL | Age: 62
End: 2022-12-21
Payer: COMMERCIAL

## 2022-12-21 VITALS
DIASTOLIC BLOOD PRESSURE: 83 MMHG | WEIGHT: 200 LBS | SYSTOLIC BLOOD PRESSURE: 140 MMHG | OXYGEN SATURATION: 95 % | TEMPERATURE: 98 F | HEART RATE: 70 BPM | RESPIRATION RATE: 14 BRPM | BODY MASS INDEX: 31.39 KG/M2 | HEIGHT: 67 IN

## 2022-12-21 DIAGNOSIS — K60.3 PERIANAL FISTULA: Primary | ICD-10-CM

## 2022-12-21 DIAGNOSIS — K60.3 ANAL FISTULA: ICD-10-CM

## 2022-12-21 PROCEDURE — 37000009 HC ANESTHESIA EA ADD 15 MINS: Performed by: COLON & RECTAL SURGERY

## 2022-12-21 PROCEDURE — 27800903 OPTIME MED/SURG SUP & DEVICES OTHER IMPLANTS: Performed by: COLON & RECTAL SURGERY

## 2022-12-21 PROCEDURE — 46030 PR REMOVAL OF RECTAL MARKER: ICD-10-PCS | Mod: 51,,, | Performed by: COLON & RECTAL SURGERY

## 2022-12-21 PROCEDURE — 36000708 HC OR TIME LEV III 1ST 15 MIN: Performed by: COLON & RECTAL SURGERY

## 2022-12-21 PROCEDURE — 46288 REPAIR ANAL FISTULA: CPT | Mod: ,,, | Performed by: COLON & RECTAL SURGERY

## 2022-12-21 PROCEDURE — 27201423 OPTIME MED/SURG SUP & DEVICES STERILE SUPPLY: Performed by: COLON & RECTAL SURGERY

## 2022-12-21 PROCEDURE — 37000008 HC ANESTHESIA 1ST 15 MINUTES: Performed by: COLON & RECTAL SURGERY

## 2022-12-21 PROCEDURE — 15271 PR SKIN SUB GRAFT TRNK/ARM/LEG: ICD-10-PCS | Mod: 51,,, | Performed by: COLON & RECTAL SURGERY

## 2022-12-21 PROCEDURE — 15272 PR SKIN SUB GRAFT T/A/L ADD-ON: ICD-10-PCS | Mod: ,,, | Performed by: COLON & RECTAL SURGERY

## 2022-12-21 PROCEDURE — 63600175 PHARM REV CODE 636 W HCPCS

## 2022-12-21 PROCEDURE — D9220A PRA ANESTHESIA: ICD-10-PCS | Mod: ,,, | Performed by: ANESTHESIOLOGY

## 2022-12-21 PROCEDURE — 15272 SKIN SUB GRAFT T/A/L ADD-ON: CPT | Mod: ,,, | Performed by: COLON & RECTAL SURGERY

## 2022-12-21 PROCEDURE — S0030 INJECTION, METRONIDAZOLE: HCPCS

## 2022-12-21 PROCEDURE — 36000709 HC OR TIME LEV III EA ADD 15 MIN: Performed by: COLON & RECTAL SURGERY

## 2022-12-21 PROCEDURE — 71000016 HC POSTOP RECOV ADDL HR: Performed by: COLON & RECTAL SURGERY

## 2022-12-21 PROCEDURE — 46288 PR REPAIR  ANAL FISTULA,RECT ADV FLAP: ICD-10-PCS | Mod: ,,, | Performed by: COLON & RECTAL SURGERY

## 2022-12-21 PROCEDURE — 25000003 PHARM REV CODE 250: Performed by: COLON & RECTAL SURGERY

## 2022-12-21 PROCEDURE — 46030 REMOVAL ANAL SETON OTH MRK: CPT | Mod: 51,,, | Performed by: COLON & RECTAL SURGERY

## 2022-12-21 PROCEDURE — 25000003 PHARM REV CODE 250

## 2022-12-21 PROCEDURE — 71000044 HC DOSC ROUTINE RECOVERY FIRST HOUR: Performed by: COLON & RECTAL SURGERY

## 2022-12-21 PROCEDURE — D9220A PRA ANESTHESIA: Mod: ,,, | Performed by: ANESTHESIOLOGY

## 2022-12-21 PROCEDURE — 25000003 PHARM REV CODE 250: Performed by: NURSE PRACTITIONER

## 2022-12-21 PROCEDURE — 71000015 HC POSTOP RECOV 1ST HR: Performed by: COLON & RECTAL SURGERY

## 2022-12-21 PROCEDURE — 15271 SKIN SUB GRAFT TRNK/ARM/LEG: CPT | Mod: 51,,, | Performed by: COLON & RECTAL SURGERY

## 2022-12-21 RX ORDER — OXYCODONE HYDROCHLORIDE 5 MG/1
5 TABLET ORAL EVERY 6 HOURS PRN
Qty: 20 TABLET | Refills: 0 | Status: SHIPPED | OUTPATIENT
Start: 2022-12-21 | End: 2022-12-21 | Stop reason: SDUPTHER

## 2022-12-21 RX ORDER — FENTANYL CITRATE 50 UG/ML
INJECTION, SOLUTION INTRAMUSCULAR; INTRAVENOUS
Status: DISCONTINUED | OUTPATIENT
Start: 2022-12-21 | End: 2022-12-21

## 2022-12-21 RX ORDER — LIDOCAINE HYDROCHLORIDE 10 MG/ML
1 INJECTION, SOLUTION EPIDURAL; INFILTRATION; INTRACAUDAL; PERINEURAL ONCE
Status: DISCONTINUED | OUTPATIENT
Start: 2022-12-21 | End: 2022-12-21 | Stop reason: HOSPADM

## 2022-12-21 RX ORDER — MIDAZOLAM HYDROCHLORIDE 1 MG/ML
INJECTION, SOLUTION INTRAMUSCULAR; INTRAVENOUS
Status: DISCONTINUED | OUTPATIENT
Start: 2022-12-21 | End: 2022-12-21

## 2022-12-21 RX ORDER — BUPIVACAINE HYDROCHLORIDE 2.5 MG/ML
INJECTION, SOLUTION EPIDURAL; INFILTRATION; INTRACAUDAL
Status: DISCONTINUED | OUTPATIENT
Start: 2022-12-21 | End: 2022-12-21 | Stop reason: HOSPADM

## 2022-12-21 RX ORDER — CEFAZOLIN SODIUM 1 G/3ML
INJECTION, POWDER, FOR SOLUTION INTRAMUSCULAR; INTRAVENOUS
Status: DISCONTINUED | OUTPATIENT
Start: 2022-12-21 | End: 2022-12-21

## 2022-12-21 RX ORDER — SODIUM CHLORIDE 9 MG/ML
INJECTION, SOLUTION INTRAVENOUS CONTINUOUS
Status: DISCONTINUED | OUTPATIENT
Start: 2022-12-21 | End: 2022-12-21 | Stop reason: HOSPADM

## 2022-12-21 RX ORDER — HYDROMORPHONE HYDROCHLORIDE 1 MG/ML
0.2 INJECTION, SOLUTION INTRAMUSCULAR; INTRAVENOUS; SUBCUTANEOUS EVERY 5 MIN PRN
Status: DISCONTINUED | OUTPATIENT
Start: 2022-12-21 | End: 2022-12-21 | Stop reason: HOSPADM

## 2022-12-21 RX ORDER — MUPIROCIN 20 MG/G
OINTMENT TOPICAL
Status: DISCONTINUED | OUTPATIENT
Start: 2022-12-21 | End: 2022-12-21 | Stop reason: HOSPADM

## 2022-12-21 RX ORDER — SODIUM CHLORIDE 0.9 % (FLUSH) 0.9 %
10 SYRINGE (ML) INJECTION
Status: DISCONTINUED | OUTPATIENT
Start: 2022-12-21 | End: 2022-12-21 | Stop reason: HOSPADM

## 2022-12-21 RX ORDER — ROCURONIUM BROMIDE 10 MG/ML
INJECTION, SOLUTION INTRAVENOUS
Status: DISCONTINUED | OUTPATIENT
Start: 2022-12-21 | End: 2022-12-21

## 2022-12-21 RX ORDER — OXYCODONE HYDROCHLORIDE 5 MG/1
5 TABLET ORAL EVERY 6 HOURS PRN
Qty: 20 TABLET | Refills: 0 | Status: SHIPPED | OUTPATIENT
Start: 2022-12-21 | End: 2023-02-23 | Stop reason: SDUPTHER

## 2022-12-21 RX ORDER — DEXAMETHASONE SODIUM PHOSPHATE 4 MG/ML
INJECTION, SOLUTION INTRA-ARTICULAR; INTRALESIONAL; INTRAMUSCULAR; INTRAVENOUS; SOFT TISSUE
Status: DISCONTINUED | OUTPATIENT
Start: 2022-12-21 | End: 2022-12-21

## 2022-12-21 RX ORDER — LIDOCAINE HYDROCHLORIDE 20 MG/ML
INJECTION, SOLUTION EPIDURAL; INFILTRATION; INTRACAUDAL; PERINEURAL
Status: DISCONTINUED | OUTPATIENT
Start: 2022-12-21 | End: 2022-12-21

## 2022-12-21 RX ORDER — METRONIDAZOLE 500 MG/100ML
INJECTION, SOLUTION INTRAVENOUS
Status: DISCONTINUED | OUTPATIENT
Start: 2022-12-21 | End: 2022-12-21

## 2022-12-21 RX ORDER — HALOPERIDOL 5 MG/ML
0.5 INJECTION INTRAMUSCULAR EVERY 10 MIN PRN
Status: DISCONTINUED | OUTPATIENT
Start: 2022-12-21 | End: 2022-12-21 | Stop reason: HOSPADM

## 2022-12-21 RX ORDER — ACETAMINOPHEN 10 MG/ML
INJECTION, SOLUTION INTRAVENOUS
Status: DISCONTINUED | OUTPATIENT
Start: 2022-12-21 | End: 2022-12-21

## 2022-12-21 RX ORDER — PROPOFOL 10 MG/ML
VIAL (ML) INTRAVENOUS
Status: DISCONTINUED | OUTPATIENT
Start: 2022-12-21 | End: 2022-12-21

## 2022-12-21 RX ADMIN — PROPOFOL 150 MG: 10 INJECTION, EMULSION INTRAVENOUS at 01:12

## 2022-12-21 RX ADMIN — CEFTRIAXONE SODIUM 2 G: 1 INJECTION, SOLUTION INTRAVENOUS at 01:12

## 2022-12-21 RX ADMIN — FENTANYL CITRATE 50 MCG: 50 INJECTION INTRAMUSCULAR; INTRAVENOUS at 02:12

## 2022-12-21 RX ADMIN — ACETAMINOPHEN 1000 MG: 10 INJECTION INTRAVENOUS at 01:12

## 2022-12-21 RX ADMIN — MIDAZOLAM 2 MG: 1 INJECTION INTRAMUSCULAR; INTRAVENOUS at 01:12

## 2022-12-21 RX ADMIN — SODIUM CHLORIDE: 9 INJECTION, SOLUTION INTRAVENOUS at 01:12

## 2022-12-21 RX ADMIN — DEXAMETHASONE SODIUM PHOSPHATE 4 MG: 4 INJECTION INTRA-ARTICULAR; INTRALESIONAL; INTRAMUSCULAR; INTRAVENOUS; SOFT TISSUE at 02:12

## 2022-12-21 RX ADMIN — LIDOCAINE HYDROCHLORIDE 60 MG: 20 INJECTION, SOLUTION EPIDURAL; INFILTRATION; INTRACAUDAL; PERINEURAL at 01:12

## 2022-12-21 RX ADMIN — MUPIROCIN: 20 OINTMENT TOPICAL at 10:12

## 2022-12-21 RX ADMIN — HYDROMORPHONE HYDROCHLORIDE 0.2 MG: 1 INJECTION, SOLUTION INTRAMUSCULAR; INTRAVENOUS; SUBCUTANEOUS at 04:12

## 2022-12-21 RX ADMIN — FENTANYL CITRATE 100 MCG: 50 INJECTION INTRAMUSCULAR; INTRAVENOUS at 01:12

## 2022-12-21 RX ADMIN — HALOPERIDOL LACTATE 0.5 MG: 5 INJECTION, SOLUTION INTRAMUSCULAR at 04:12

## 2022-12-21 RX ADMIN — SUGAMMADEX 200 MG: 100 INJECTION, SOLUTION INTRAVENOUS at 02:12

## 2022-12-21 RX ADMIN — METRONIDAZOLE 500 MG: 500 INJECTION, SOLUTION INTRAVENOUS at 01:12

## 2022-12-21 RX ADMIN — SODIUM CHLORIDE: 9 INJECTION, SOLUTION INTRAVENOUS at 10:12

## 2022-12-21 RX ADMIN — ROCURONIUM BROMIDE 50 MG: 50 INJECTION, SOLUTION INTRAVENOUS at 01:12

## 2022-12-21 NOTE — ANESTHESIA PROCEDURE NOTES
Intubation    Date/Time: 12/21/2022 1:36 PM  Performed by: Robinson Benitez DO  Authorized by: Ameya Mendoza MD     Intubation:     Induction:  Intravenous    Intubated:  Postinduction    Mask Ventilation:  Easy with oral airway    Attempts:  1    Attempted By:  Resident anesthesiologist    Method of Intubation:  Direct    Blade:  Amos 3    Laryngeal View Grade: Grade IIb - only the arytenoids and epiglottis seen      Difficult Airway Encountered?: No      Complications:  None    Airway Device:  Oral endotracheal tube    Airway Device Size:  7.5    Style/Cuff Inflation:  Cuffed (inflated to minimal occlusive pressure)    Inflation Amount (mL):  7    Tube secured:  23    Secured at:  The lips    Placement Verified By:  Capnometry    Complicating Factors:  None    Findings Post-Intubation:  BS equal bilateral and atraumatic/condition of teeth unchanged

## 2022-12-21 NOTE — BRIEF OP NOTE
[facility] - Surgery (2nd Fl)  Brief Operative Note    Surgery Date: 12/21/2022     Surgeon(s) and Role:     * Esteban Leung MD - Primary     * Fela Rubio MD - Resident - Assisting        Pre-op Diagnosis:  Perianal fistula [K60.3]    Post-op Diagnosis:  Post-Op Diagnosis Codes:     * Perianal fistula [K60.3]    Procedure(s) (LRB):  FISTULOTOMY, RECTUM (N/A)  REPLACEMENT-HOMOGRAFT (N/A)    Anesthesia: General    Operative Findings: Left sided transsphincteric fistula in ano    Estimated Blood Loss: * No values recorded between 12/21/2022  1:56 PM and 12/21/2022  3:02 PM *         Specimens:   Specimen (24h ago, onward)      None              Discharge Note    OUTCOME: Patient tolerated treatment/procedure well without complication and is now ready for discharge.    DISPOSITION: Home or Self Care    FINAL DIAGNOSIS:  Perianal fistula    FOLLOWUP: In clinic    DISCHARGE INSTRUCTIONS:    Discharge Procedure Orders   Diet Adult Regular     Notify your health care provider if you experience any of the following:  increased confusion or weakness     Notify your health care provider if you experience any of the following:  persistent dizziness, light-headedness, or visual disturbances     Notify your health care provider if you experience any of the following:  worsening rash     Notify your health care provider if you experience any of the following:  severe persistent headache     Notify your health care provider if you experience any of the following:  difficulty breathing or increased cough     Notify your health care provider if you experience any of the following:  redness, tenderness, or signs of infection (pain, swelling, redness, odor or green/yellow discharge around incision site)     Notify your health care provider if you experience any of the following:  severe uncontrolled pain     Notify your health care provider if you experience any of the following:  persistent nausea and vomiting or diarrhea      Notify your health care provider if you experience any of the following:  temperature >100.4     No dressing needed     Activity as tolerated

## 2022-12-21 NOTE — ANESTHESIA PREPROCEDURE EVALUATION
12/21/2022  Jimmy Shaikh is a 62 y.o., male.    Pre-operative evaluation for Procedure(s) (LRB):  FISTULOTOMY, RECTUM (N/A)    Jimmy Shaikh is a 62 y.o. male     Patient Active Problem List   Diagnosis    Dysphagia, pharyngoesophageal    Perirectal abscess    Perianal fistula       Review of patient's allergies indicates:  No Known Allergies    Current Facility-Administered Medications on File Prior to Encounter   Medication Dose Route Frequency Provider Last Rate Last Admin    0.9%  NaCl infusion   Intravenous Continuous Romario Forbes MD   Stopped at 12/15/21 1055    0.9%  NaCl infusion   Intravenous Continuous Kathi Gallagher NP        lactated ringers infusion   Intravenous Continuous Willis Giron MD 0 mL/hr at 02/14/22 1130 New Bag at 05/25/22 1206    mupirocin 2 % ointment   Nasal On Call Procedure Kathi Gallagher NP   Given at 06/29/22 0645     Current Outpatient Medications on File Prior to Encounter   Medication Sig Dispense Refill    atorvastatin (LIPITOR) 20 MG tablet Take 20 mg by mouth every evening.      esomeprazole (NEXIUM) 20 MG capsule Take 20 mg by mouth every evening.      gabapentin (NEURONTIN) 100 MG capsule Take 2 capsules (200 mg total) by mouth every 8 (eight) hours. Please take every 8 hours scheduled for 1 week. Take as needed after 1 week if pain well controlled. 42 capsule 0    oxyCODONE (ROXICODONE) 5 MG immediate release tablet Take 1 tablet (5 mg total) by mouth every 6 (six) hours as needed for Pain. 30 tablet 0    oxyCODONE-acetaminophen (PERCOCET) 5-325 mg per tablet Take 1 tablet by mouth every 4 (four) hours as needed for Pain. 20 tablet 0    pantoprazole (PROTONIX) 40 MG tablet Take 40 mg by mouth every evening.      polyethylene glycol (GLYCOLAX) 17 gram/dose powder Take 17 g by mouth nightly as needed (to help prevent  contipation). 510 g 0    acetaminophen (TYLENOL) 500 MG tablet Take 2 tablets (1,000 mg total) by mouth every 8 (eight) hours. Please take every 8 hours scheduled for 1 week. Take as needed after 1 week if pain well controlled. 60 tablet 2    aspirin (ECOTRIN) 81 MG EC tablet Take 81 mg by mouth once daily.      HYDROcodone-acetaminophen (NORCO)  mg per tablet Take 1 tablet by mouth every 4 (four) hours as needed for Pain. (Patient not taking: Reported on 12/15/2022) 30 tablet 0    ibuprofen (ADVIL,MOTRIN) 800 MG tablet Take 1 tablet (800 mg total) by mouth every 8 (eight) hours. Please take every 8 hours scheduled for 1 week. Take as needed after 1 week if pain well controlled. (Patient not taking: Reported on 12/20/2022) 60 tablet 2    lisinopril 10 MG tablet Take 10 mg by mouth every evening.      psyllium husk, aspartame, (METAMUCIL FIBER SINGLES) 3.4 gram PwPk packet Take 1 packet by mouth 2 (two) times daily. Take one (1) packet by mouth every morning and evening to help prevent constipation. (Patient not taking: Reported on 12/15/2022) 60 packet 0       Past Surgical History:   Procedure Laterality Date    ANTERIOR CRUCIATE LIGAMENT REPAIR Left     BONE CYST EXCISION      neck    CLUB FOOT RELEASE Bilateral     COLONOSCOPY N/A 03/10/2017    Procedure: COLONOSCOPY;  Surgeon: Romario Forbes MD;  Location: [facility] ENDO;  Service: Endoscopy;  Laterality: N/A;    COLONOSCOPY N/A 12/15/2021    Procedure: COLONOSCOPY;  Surgeon: Romario Forbes MD;  Location: [facility] ENDO;  Service: Endoscopy;  Laterality: N/A;    COSMETIC SURGERY      deviated septum    CYST REMOVAL      on neck    ESOPHAGOGASTRODUODENOSCOPY (EGD) WITH DILATION      EXAMINATION UNDER ANESTHESIA Left 4/21/2022    Procedure: Exam under anesthesia;  Surgeon: Casper Wolf MD;  Location: [facility] OR;  Service: General;  Laterality: Left;    EYE SURGERY      lasik    FACIAL RECONSTRUCTION Left     CHEEK BONE    INCISION AND  DRAINAGE OF PERIANAL FISTULA Left 4/21/2022    Procedure: INCISION AND DRAINAGE, FISTULA, PERIANAL;  Surgeon: Casper Wolf MD;  Location: [facility] OR;  Service: General;  Laterality: Left;    INCISION AND DRAINAGE OF PERIRECTAL REGION N/A 02/14/2022    Procedure: INCISION AND DRAINAGE, PERIRECTAL REGION;  Surgeon: Casper Wolf MD;  Location: [facility] OR;  Service: General;  Laterality: N/A;  8:30 per MD    INSERTION OF SETON STITCH N/A 6/29/2022    Procedure: PLACEMENT, SETON STITCH;  Surgeon: Esteban Leung MD;  [facility];  Service: Colon and Rectal;  Laterality: N/A;    MANDIBLE FRACTURE SURGERY      REPAIR OF ANAL FISTULA N/A 5/25/2022    Procedure: CLOSURE, FISTULA, ANAL;  Surgeon: Casper Wolf MD;  Location: [facility] OR;  Service: General;  Laterality: N/A;  10am per MD    UPPER GASTROINTESTINAL ENDOSCOPY             Pre-op Assessment    I have reviewed the Patient Summary Reports.     I have reviewed the Nursing Notes. I have reviewed the NPO Status.   I have reviewed the Medications.     Review of Systems  Cardiovascular:   Hypertension    Hepatic/GI:   GERD           Anesthesia Plan  Type of Anesthesia, risks & benefits discussed:    Anesthesia Type: Gen ETT  Intra-op Monitoring Plan: Standard ASA Monitors  Post Op Pain Control Plan: multimodal analgesia    .

## 2022-12-21 NOTE — PATIENT INSTRUCTIONS
Anal Surgery Post Op Instructions:    You had a repair of perianal fistula performed today.     Wound care:  Expect some drainage over the next few weeks as the wound heals.  Please wear a pad to help with drainage.     You have no activity restrictions.   No dietary restrictions.    Medications:  A narcotic pain medication has been prescribed.  Please start to wean the pain medication over the following few days.  You can take tylenol instead of the pain medication.      Please take miralax 1 capful at night to prevent constipation associated with narcotic pain medications.      Follow up:  Return to clinic in 4 weeks for follow up and wound check.

## 2022-12-21 NOTE — OP NOTE
JIMMY SHAIKH  [id number]  [id number]    OPERATIVE NOTE:    DATE OF OPERATION: 12/21/2022    PREOPERATIVE DIAGNOSIS: Anal fistula, high transsphincteric.      POSTOPERATIVE DIAGNOSIS: Anal fistula, high transsphincteric    PROCEDURE PERFORMED1) Removal of anal seton 2) Application of Kerecis [place] 3 SurgiClose, 76 square cm to treat complex anal fistula.  Fistula closure  3)Closure of anal fistula with mucosal advancement flap    ATTENDING SURGEON: Esteban Leung MD    RESIDENT/FELLOW SURGEON: Fela Rubio MD    ANESTHESIA: GETA    ESTIMATED BLOOD LOSS: 10 mL    FINDINGS:  1. HIgh transphincteric anorectal fistula    SPECIMENS:   None    COMPLICATIONS:none    DISPOSITION: PACU    CONDITION: good    INDICATION FOR PROCEDURE:  Jimmy Shaikh is a 62 y.o. male who underwent I and D of abscess in FEB this year, rectal mucosal advancement flapin MAY and Seton placement in April and June.    DESCRIPTION OF PROCEDURE:   After obtaining informed consent the patient was taken the operating room placed in the supine position.  He underwent general endotracheal anesthesia and then was placed prone rosy-knife.  He was prepped draped sterile manner.  A preoperative time-out was performed.    A fistulotomy probe was placed through the tract with the seton placed demonstrate that we could get a probe through the entire tract.  The seton was then cut and removed.  The tract was curetted aggressively.  An anoscope was placed and the area of the internal opening was identified and the mucosa surrounding that was excised flaps of approximately 1 cm were raised circumferentially.  The internal opening was fulgurated.  Seventy-six sq cm of Kerecis Omega3 SurgiClose Micro was filled in to the tract.  The internal opening was closed with interrupted 3-0 Vicryl placed in a horizontal mattress fashion.  Anal block with Exparel was performed.  Sterile dressing was applied.  The patient tolerated procedure well was taken  recovery room in stable condition.      Esteban Leung MD  , Colon & Rectal Surgery

## 2022-12-21 NOTE — ANESTHESIA PREPROCEDURE EVALUATION
[facility]  Anesthesia Pre-Operative Evaluation         Patient Name/: Jimmy Shaikh, 1960  MRN: [id number]    SUBJECTIVE:     Pre-operative evaluation for Procedure(s) (LRB):  FISTULOTOMY, RECTUM (N/A)     2022    Jimmy Shaikh is a 62 y.o. male w/ a significant PMHx of HTN, HLD, and recurrent perianal fistula with recent seton stitch placement 22. Now planning for fistulotomy.    Patient now presents for the above procedure(s).      Prev airway:   Intubation 22  Mask Ventilation:  Easy with oral airway    Attempts:  1    Attempted By:  Student    Method of Intubation:  Bougie    Blade:  Amos 3    Laryngeal View Grade: Grade IIA - cords partially seen      Difficult Airway Encountered?: No      Complications:  None    Airway Device:  Oral endotracheal tube    Airway Device Size:  7.5    Style/Cuff Inflation:  Cuffed    Inflation Amount (mL):  8    Tube secured:  25    Secured at:  The lips    LDA:        Peripheral IV - Single Lumen 22 1025 18 G Anterior;Left Wrist (Active)   Site Assessment Clean;Dry;Intact 22 1025   Line Status Blood return noted 22 1025   Dressing Status Clean;Dry;Intact 22 1025   Dressing Intervention First dressing 22 1025   Number of days: 1            Open Drain 22 0832 Other (Comment) Other (see comments) (Active)   Number of days: 175       Drips:    sodium chloride 0.9% 70 mL/hr at 22 1029       Patient Active Problem List   Diagnosis    Dysphagia, pharyngoesophageal    Perirectal abscess    Perianal fistula       Review of patient's allergies indicates:  No Known Allergies    Current Inpatient Medications:    LIDOcaine (PF) 10 mg/ml (1%)  1 mL Intradermal Once       Current Facility-Administered Medications on File Prior to Encounter   Medication Dose Route Frequency Provider Last Rate Last Admin    0.9%  NaCl infusion   Intravenous Continuous Romario Forbes MD   Stopped at  12/15/21 1055    0.9%  NaCl infusion   Intravenous Continuous Kathi Gallagher NP        lactated ringers infusion   Intravenous Continuous Willis Giron MD 0 mL/hr at 02/14/22 1130 New Bag at 05/25/22 1206    mupirocin 2 % ointment   Nasal On Call Procedure Kathi Gallagher NP   Given at 06/29/22 0645     Current Outpatient Medications on File Prior to Encounter   Medication Sig Dispense Refill    atorvastatin (LIPITOR) 20 MG tablet Take 20 mg by mouth every evening.      esomeprazole (NEXIUM) 20 MG capsule Take 20 mg by mouth every evening.      gabapentin (NEURONTIN) 100 MG capsule Take 2 capsules (200 mg total) by mouth every 8 (eight) hours. Please take every 8 hours scheduled for 1 week. Take as needed after 1 week if pain well controlled. 42 capsule 0    oxyCODONE (ROXICODONE) 5 MG immediate release tablet Take 1 tablet (5 mg total) by mouth every 6 (six) hours as needed for Pain. 30 tablet 0    oxyCODONE-acetaminophen (PERCOCET) 5-325 mg per tablet Take 1 tablet by mouth every 4 (four) hours as needed for Pain. 20 tablet 0    pantoprazole (PROTONIX) 40 MG tablet Take 40 mg by mouth every evening.      polyethylene glycol (GLYCOLAX) 17 gram/dose powder Take 17 g by mouth nightly as needed (to help prevent contipation). 510 g 0    acetaminophen (TYLENOL) 500 MG tablet Take 2 tablets (1,000 mg total) by mouth every 8 (eight) hours. Please take every 8 hours scheduled for 1 week. Take as needed after 1 week if pain well controlled. 60 tablet 2    aspirin (ECOTRIN) 81 MG EC tablet Take 81 mg by mouth once daily.      HYDROcodone-acetaminophen (NORCO)  mg per tablet Take 1 tablet by mouth every 4 (four) hours as needed for Pain. (Patient not taking: Reported on 12/15/2022) 30 tablet 0    ibuprofen (ADVIL,MOTRIN) 800 MG tablet Take 1 tablet (800 mg total) by mouth every 8 (eight) hours. Please take every 8 hours scheduled for 1 week. Take as needed after 1 week if pain well controlled.  (Patient not taking: Reported on 12/20/2022) 60 tablet 2    lisinopril 10 MG tablet Take 10 mg by mouth every evening.      psyllium husk, aspartame, (METAMUCIL FIBER SINGLES) 3.4 gram PwPk packet Take 1 packet by mouth 2 (two) times daily. Take one (1) packet by mouth every morning and evening to help prevent constipation. (Patient not taking: Reported on 12/15/2022) 60 packet 0       Past Surgical History:   Procedure Laterality Date    ANTERIOR CRUCIATE LIGAMENT REPAIR Left     BONE CYST EXCISION      neck    CLUB FOOT RELEASE Bilateral     COLONOSCOPY N/A 03/10/2017    Procedure: COLONOSCOPY;  Surgeon: Romario Forbes MD;  Location: [facility] ENDO;  Service: Endoscopy;  Laterality: N/A;    COLONOSCOPY N/A 12/15/2021    Procedure: COLONOSCOPY;  Surgeon: Romario Forbes MD;  [facility];  Service: Endoscopy;  Laterality: N/A;    COSMETIC SURGERY      deviated septum    CYST REMOVAL      on neck    ESOPHAGOGASTRODUODENOSCOPY (EGD) WITH DILATION      EXAMINATION UNDER ANESTHESIA Left 4/21/2022    Procedure: Exam under anesthesia;  Surgeon: Casper Wolf MD;  Location: [facility];  Service: General;  Laterality: Left;    EYE SURGERY      lasik    FACIAL RECONSTRUCTION Left     CHEEK BONE    INCISION AND DRAINAGE OF PERIANAL FISTULA Left 4/21/2022    Procedure: INCISION AND DRAINAGE, FISTULA, PERIANAL;  Surgeon: Casper Wolf MD;  Location: [facility];  Service: General;  Laterality: Left;    INCISION AND DRAINAGE OF PERIRECTAL REGION N/A 02/14/2022    Procedure: INCISION AND DRAINAGE, PERIRECTAL REGION;  Surgeon: Casper Wolf MD;  Location: [facility] OR;  Service: General;  Laterality: N/A;  8:30 per MD    INSERTION OF SETON STITCH N/A 6/29/2022    Procedure: PLACEMENT, SETON STITCH;  Surgeon: Esteban Leung MD;  Location: [address];  Service: Colon and Rectal;  Laterality: N/A;    MANDIBLE FRACTURE SURGERY      REPAIR OF ANAL FISTULA N/A 5/25/2022    Procedure: CLOSURE, FISTULA,  ANAL;  Surgeon: Casper Wolf MD;  Location: [facility];  Service: General;  Laterality: N/A;  10am per MD    UPPER GASTROINTESTINAL ENDOSCOPY         Social History:  Tobacco Use: Medium Risk    Smoking Tobacco Use: Former    Smokeless Tobacco Use: Never    Passive Exposure: Not on file       Alcohol Use: Not on file       OBJECTIVE:     Vital Signs Range:  BMI Readings from Last 1 Encounters:   12/21/22 31.32 kg/m²       Temp:  [36.7 °C (98 °F)]   Pulse:  [67]   Resp:  [18]   BP: (122)/(82)   SpO2:  [94 %]        Significant Labs:        Component Value Date/Time    WBC 7.60 05/24/2022 1334    HGB 14.5 05/24/2022 1334    HCT 41.9 05/24/2022 1334     05/24/2022 1334     05/24/2022 1334    K 4.2 05/24/2022 1334     05/24/2022 1334    CO2 25 05/24/2022 1334     (H) 05/24/2022 1334    BUN 13 05/24/2022 1334    CREATININE 0.93 05/24/2022 1334    CALCIUM 9.5 05/24/2022 1334    ALBUMIN 4.6 05/24/2022 1334    PROT 7.6 05/24/2022 1334    ALKPHOS 98 05/24/2022 1334    BILITOT 0.5 05/24/2022 1334    AST 35 05/24/2022 1334    ALT 44 05/24/2022 1334    INR 1.1 11/27/2021 0950        Please see Results Review for additional labs.     Diagnostic Studies: No relevant studies.    EKG:   No results found for this or any previous visit.    ECHO:  No results found for this or any previous visit.        ASSESSMENT/PLAN:       Pre-op Assessment    I have reviewed the Patient Summary Reports.     I have reviewed the Nursing Notes. I have reviewed the NPO Status.      Review of Systems  Anesthesia Hx:  No problems with previous Anesthesia    Cardiovascular:   Hypertension    Hepatic/GI:   GERD        Physical Exam  General: Well nourished and Cooperative    Airway:  Mallampati: II   Mouth Opening: Normal  Tongue: Normal  Neck ROM: Normal ROM    Dental:  Intact        Anesthesia Plan  Type of Anesthesia, risks & benefits discussed:    Anesthesia Type: Gen ETT  Intra-op Monitoring Plan: Standard ASA  Monitors  Post Op Pain Control Plan: multimodal analgesia  Induction:  IV  Airway Plan: Direct, Post-Induction  Informed Consent: Informed consent signed with the Patient and all parties understand the risks and agree with anesthesia plan.  All questions answered.   ASA Score: 3  Day of Surgery Review of History & Physical: H&P Update referred to the surgeon/provider.    Ready For Surgery From Anesthesia Perspective.     .

## 2022-12-21 NOTE — TRANSFER OF CARE
"Anesthesia Transfer of Care Note    Patient: Jimmy Shaikh    Procedure(s) Performed: Procedure(s) (LRB):  FISTULOTOMY, RECTUM (N/A)  REPLACEMENT-HOMOGRAFT (N/A)    Patient location: [facility]    Anesthesia Type: general    Transport from OR: Transported from OR on 6-10 L/min O2 by face mask with adequate spontaneous ventilation    Post pain: adequate analgesia    Post assessment: no apparent anesthetic complications and tolerated procedure well    Post vital signs: stable    Level of consciousness: awake and alert    Nausea/Vomiting: no nausea/vomiting    Complications: none    Transfer of care protocol was followed      Last vitals:   Visit Vitals  /82 (BP Location: Left arm, Patient Position: Lying)   Pulse 67   Temp 36.7 °C (98 °F) (Oral)   Resp 18   Ht 5' 7" (1.702 m)   Wt 90.7 kg (200 lb)   SpO2 (!) 94%   BMI 31.32 kg/m²     "

## 2022-12-22 ENCOUNTER — PATIENT MESSAGE (OUTPATIENT)
Dept: SURGERY | Facility: CLINIC | Age: 62
End: 2022-12-22
Payer: COMMERCIAL

## 2022-12-23 NOTE — ANESTHESIA POSTPROCEDURE EVALUATION
Anesthesia Post Evaluation    Patient: Jimmy Shaikh    Procedure(s) Performed: Procedure(s) (LRB):  FISTULOTOMY, RECTUM (N/A)  REPLACEMENT-HOMOGRAFT (N/A)    Final Anesthesia Type: general      Patient location during evaluation: PACU  Patient participation: Yes- Able to Participate  Level of consciousness: awake and alert and oriented  Post-procedure vital signs: reviewed and stable  Pain management: adequate  Airway patency: patent  KIRSTIE mitigation strategies: Intraoperative administration of CPAP, nasopharyngeal airway, or oral appliance during sedation, Multimodal analgesia, Extubation while patient is awake, Verification of full reversal of neuromuscular block and Extubation and recovery carried out in lateral, semiupright, or other nonsupine position  PONV status at discharge: No PONV  Anesthetic complications: no      Cardiovascular status: hemodynamically stable  Respiratory status: unassisted  Hydration status: euvolemic  Follow-up not needed.          Vitals Value Taken Time   /83 12/21/22 1646   Temp 36.7 °C (98 °F) 12/21/22 1515   Pulse 65 12/21/22 1702   Resp 17 12/21/22 1656   SpO2 93 % 12/21/22 1702   Vitals shown include unvalidated device data.      No case tracking events are documented in the log.      Pain/Amparo Score: No data recorded

## 2023-01-18 NOTE — PROGRESS NOTES
"  CRS Office Visit Follow-up  Referring Md:   No referring provider defined for this encounter.    SUBJECTIVE:     Chief Complaint: Anorectal fistula    History of Present Illness:  Patient is a 62 y.o. male presents with Anorectal fistula. The patient is a established patient to this practice.  He underwent fistula closure with [facility] on 12/21/    Prior anorectal surgical history:  [facility] (Dr. David Wolf)   - 2/14/22 EUA + I&D abscess: 9x7cm perianal abscess extending into left buttock, 2 vessels loops placed via counterincisions for drainage   - 4/21/22 EUA + seton placement: Perianal fistula with external opening at 2:00, internal opening posterior midline at dentate. Draining silk seton placed.   - 5/25/22 EUA + hemorrhoidectomy + rectal mucosal advancement flap: 3 column internal hemorrhoidectomy with Ligasure, rectal mucosal advancement flap used to cover the posterior midline fistula, fistula tract packed with 1/2" iodoform gauze     [facility] (Madhu)   - 6/29/22 EUA + fistulotomy + seton placement: left anterolateral external opening which tracked posterior eventually to the midline above the dentate line, posterior midline intersphincteric fistula.     10/13/22:  Patient returns today for ongoing follow up. Notes persistent drainage/seepage, intermittently bloody, requiring daily use of pad. Occasional pain/irritation with bowel movements, taking metamucil and miralax for soft stools 2-3x/d, denies straining or tissue prolapse. Denies fevers/chills/sweats or constant perianal pain.     Interval history (12/15/22):  Patient returns for follow up. Continues to have persistent drainage which is intermittently bloody. Has been taking metamucil and miralax daily, with regular well formed bowel movements. Denies fevers/chills.     MRI (12/13/22):  Transsphincteric seton containing perianal fistula with left-sided blind-ending pocket and dominant fistulous tract exiting at the left gluteal " "cleft.  Both tract and blind-ending pocket demonstrate signal characteristics suggestive for granulation tissue.  No residual abscess.    Last Colonoscopy: 2021 at [place], per patient negative except for diverticulosis.    1/19/23 Follow up:  Drainage is decreasing, but still having to change pad 6-8 times a day due to drainage. He feels like there is something hard near his rectum. After bowel movements, experiences major pain that persists for long periods of time. He sometimes needs to sit in warm bath tub to help the pain subside.     Review of Systems:  ROS    OBJECTIVE:     Vital Signs (Most Recent)/84 (BP Location: Right arm, Patient Position: Sitting)   Pulse 65   Resp 19   Ht 5' 7" (1.702 m)   Wt 89.6 kg (197 lb 8 oz)   BMI 30.93 kg/m²       Physical Exam:  General: White male in no distress   Neuro: alert and oriented x 4.  Moves all extremities.     HEENT: no icterus.  Trachea midline  Respiratory: respirations are even and unlabored  Cardiac: regular rate  Abdomen: Soft, NT/ND  Extremities: Warm dry and intact  Skin: no rashes        ASSESSMENT/PLAN:     There are no diagnoses linked to this encounter.    62 y.o. male with anorectal fistula    - s/p repair - ongoing serous drainage and some pain.     Cipro/Flagyl x 10 days - f/u 10 days       "

## 2023-01-19 ENCOUNTER — OFFICE VISIT (OUTPATIENT)
Dept: SURGERY | Facility: CLINIC | Age: 63
End: 2023-01-19
Payer: COMMERCIAL

## 2023-01-19 VITALS
SYSTOLIC BLOOD PRESSURE: 136 MMHG | HEIGHT: 67 IN | BODY MASS INDEX: 31 KG/M2 | HEART RATE: 65 BPM | RESPIRATION RATE: 19 BRPM | DIASTOLIC BLOOD PRESSURE: 84 MMHG | WEIGHT: 197.5 LBS

## 2023-01-19 DIAGNOSIS — K60.3 PERIANAL FISTULA: Primary | ICD-10-CM

## 2023-01-19 PROCEDURE — 99024 PR POST-OP FOLLOW-UP VISIT: ICD-10-PCS | Mod: S$GLB,,, | Performed by: COLON & RECTAL SURGERY

## 2023-01-19 PROCEDURE — 3075F PR MOST RECENT SYSTOLIC BLOOD PRESS GE 130-139MM HG: ICD-10-PCS | Mod: CPTII,S$GLB,, | Performed by: COLON & RECTAL SURGERY

## 2023-01-19 PROCEDURE — 3008F PR BODY MASS INDEX (BMI) DOCUMENTED: ICD-10-PCS | Mod: CPTII,S$GLB,, | Performed by: COLON & RECTAL SURGERY

## 2023-01-19 PROCEDURE — 99024 POSTOP FOLLOW-UP VISIT: CPT | Mod: S$GLB,,, | Performed by: COLON & RECTAL SURGERY

## 2023-01-19 PROCEDURE — 99999 PR PBB SHADOW E&M-EST. PATIENT-LVL IV: ICD-10-PCS | Mod: PBBFAC,,, | Performed by: COLON & RECTAL SURGERY

## 2023-01-19 PROCEDURE — 3079F PR MOST RECENT DIASTOLIC BLOOD PRESSURE 80-89 MM HG: ICD-10-PCS | Mod: CPTII,S$GLB,, | Performed by: COLON & RECTAL SURGERY

## 2023-01-19 PROCEDURE — 3079F DIAST BP 80-89 MM HG: CPT | Mod: CPTII,S$GLB,, | Performed by: COLON & RECTAL SURGERY

## 2023-01-19 PROCEDURE — 99999 PR PBB SHADOW E&M-EST. PATIENT-LVL IV: CPT | Mod: PBBFAC,,, | Performed by: COLON & RECTAL SURGERY

## 2023-01-19 PROCEDURE — 3008F BODY MASS INDEX DOCD: CPT | Mod: CPTII,S$GLB,, | Performed by: COLON & RECTAL SURGERY

## 2023-01-19 PROCEDURE — 1160F PR REVIEW ALL MEDS BY PRESCRIBER/CLIN PHARMACIST DOCUMENTED: ICD-10-PCS | Mod: CPTII,S$GLB,, | Performed by: COLON & RECTAL SURGERY

## 2023-01-19 PROCEDURE — 1159F PR MEDICATION LIST DOCUMENTED IN MEDICAL RECORD: ICD-10-PCS | Mod: CPTII,S$GLB,, | Performed by: COLON & RECTAL SURGERY

## 2023-01-19 PROCEDURE — 3075F SYST BP GE 130 - 139MM HG: CPT | Mod: CPTII,S$GLB,, | Performed by: COLON & RECTAL SURGERY

## 2023-01-19 PROCEDURE — 1160F RVW MEDS BY RX/DR IN RCRD: CPT | Mod: CPTII,S$GLB,, | Performed by: COLON & RECTAL SURGERY

## 2023-01-19 PROCEDURE — 1159F MED LIST DOCD IN RCRD: CPT | Mod: CPTII,S$GLB,, | Performed by: COLON & RECTAL SURGERY

## 2023-01-19 RX ORDER — CIPROFLOXACIN 500 MG/1
500 TABLET ORAL EVERY 12 HOURS
Qty: 20 TABLET | Refills: 0 | Status: SHIPPED | OUTPATIENT
Start: 2023-01-19 | End: 2023-01-29

## 2023-01-19 RX ORDER — ACETAMINOPHEN 500 MG
1000 TABLET ORAL EVERY 8 HOURS
Qty: 60 TABLET | Refills: 2 | Status: SHIPPED | OUTPATIENT
Start: 2023-01-19 | End: 2023-01-27 | Stop reason: SDUPTHER

## 2023-01-19 RX ORDER — METRONIDAZOLE 500 MG/1
500 TABLET ORAL EVERY 8 HOURS
Qty: 30 TABLET | Refills: 0 | Status: SHIPPED | OUTPATIENT
Start: 2023-01-19 | End: 2023-01-29

## 2023-01-26 ENCOUNTER — PATIENT MESSAGE (OUTPATIENT)
Dept: SURGERY | Facility: CLINIC | Age: 63
End: 2023-01-26
Payer: COMMERCIAL

## 2023-01-27 DIAGNOSIS — K60.3 PERIANAL FISTULA: ICD-10-CM

## 2023-01-27 RX ORDER — ACETAMINOPHEN 500 MG
1000 TABLET ORAL EVERY 8 HOURS
Qty: 60 TABLET | Refills: 2 | Status: ON HOLD | OUTPATIENT
Start: 2023-01-27 | End: 2023-07-11 | Stop reason: SDUPTHER

## 2023-02-01 NOTE — PROGRESS NOTES
"  CRS Office Visit Follow-up  Referring Md:   No referring provider defined for this encounter.    SUBJECTIVE:     Chief Complaint: Anorectal fistula    History of Present Illness:  Patient is a 62 y.o. male presents with Anorectal fistula. The patient is a established patient to this practice.  He underwent fistula closure with [facility]rec[facility] S[facility] on 12/21/    Prior anorectal surgical history:  [facility] (Dr. David Wolf)   - 2/14/22 EUA + I&D abscess: 9x7cm perianal abscess extending into left buttock, 2 vessels loops placed via counterincisions for drainage   - 4/21/22 EUA + seton placement: Perianal fistula with external opening at 2:00, internal opening posterior midline at dentate. Draining silk seton placed.   - 5/25/22 EUA + hemorrhoidectomy + rectal mucosal advancement flap: 3 column internal hemorrhoidectomy with Ligasure, rectal mucosal advancement flap used to cover the posterior midline fistula, fistula tract packed with 1/2" iodoform gauze     [facility] (Madhu)   - 6/29/22 EUA + fistulotomy + seton placement: left anterolateral external opening which tracked posterior eventually to the midline above the dentate line, posterior midline intersphincteric fistula.         1/19/23 Follow up:  Drainage is decreasing, but still having to change pad 6-8 times a day due to drainage. He feels like there is something hard near his rectum. After bowel movements, experiences major pain that persists for long periods of time. He sometimes needs to sit in warm bath tub to help the pain subside.     2/2/23 Follow up: Pain improved - overall but not resolved.  Drainage has decreased some. He does feel overall better than prior to surgery.     Review of Systems:  Review of Systems   Constitutional:  Negative for chills and fever.     OBJECTIVE:     Vital Signs (Most Recent)There were no vitals taken for this visit.      Physical Exam:  General: White male in no distress   Neuro: alert and oriented x 4.  Moves all " extremities.     HEENT: no icterus.  Trachea midline  Respiratory: respirations are even and unlabored  Cardiac: regular rate  Abdomen: Soft, NT/ND  Extremities: Warm dry and intact  Skin: no rashes        ASSESSMENT/PLAN:     There are no diagnoses linked to this encounter.    62 y.o. male with anorectal fistula    - s/p repair - ongoing serous drainage and some pain.     Today I infiltrated the area of the external opening with lidocaine and unroof/excised the granulation tissue.  This area was nontender with no surrounding cellulitis.  The area of the tract is still palpable and there is a 5 mm palpable mucosal defect in the anal canal consistent with where the internal opening was closed.  This area was nontender a tolerated the exam well.    Followup 2 weeks

## 2023-02-02 ENCOUNTER — OFFICE VISIT (OUTPATIENT)
Dept: SURGERY | Facility: CLINIC | Age: 63
End: 2023-02-02
Payer: COMMERCIAL

## 2023-02-02 VITALS
WEIGHT: 199 LBS | SYSTOLIC BLOOD PRESSURE: 134 MMHG | DIASTOLIC BLOOD PRESSURE: 74 MMHG | HEART RATE: 60 BPM | HEIGHT: 67 IN | BODY MASS INDEX: 31.23 KG/M2

## 2023-02-02 DIAGNOSIS — Z09 POSTOP CHECK: ICD-10-CM

## 2023-02-02 DIAGNOSIS — K60.3 PERIANAL FISTULA: Primary | ICD-10-CM

## 2023-02-02 PROCEDURE — 99999 PR PBB SHADOW E&M-EST. PATIENT-LVL III: CPT | Mod: PBBFAC,,, | Performed by: COLON & RECTAL SURGERY

## 2023-02-02 PROCEDURE — 3075F SYST BP GE 130 - 139MM HG: CPT | Mod: CPTII,S$GLB,, | Performed by: COLON & RECTAL SURGERY

## 2023-02-02 PROCEDURE — 3075F PR MOST RECENT SYSTOLIC BLOOD PRESS GE 130-139MM HG: ICD-10-PCS | Mod: CPTII,S$GLB,, | Performed by: COLON & RECTAL SURGERY

## 2023-02-02 PROCEDURE — 99999 PR PBB SHADOW E&M-EST. PATIENT-LVL III: ICD-10-PCS | Mod: PBBFAC,,, | Performed by: COLON & RECTAL SURGERY

## 2023-02-02 PROCEDURE — 1159F MED LIST DOCD IN RCRD: CPT | Mod: CPTII,S$GLB,, | Performed by: COLON & RECTAL SURGERY

## 2023-02-02 PROCEDURE — 1159F PR MEDICATION LIST DOCUMENTED IN MEDICAL RECORD: ICD-10-PCS | Mod: CPTII,S$GLB,, | Performed by: COLON & RECTAL SURGERY

## 2023-02-02 PROCEDURE — 3008F BODY MASS INDEX DOCD: CPT | Mod: CPTII,S$GLB,, | Performed by: COLON & RECTAL SURGERY

## 2023-02-02 PROCEDURE — 3078F PR MOST RECENT DIASTOLIC BLOOD PRESSURE < 80 MM HG: ICD-10-PCS | Mod: CPTII,S$GLB,, | Performed by: COLON & RECTAL SURGERY

## 2023-02-02 PROCEDURE — 3008F PR BODY MASS INDEX (BMI) DOCUMENTED: ICD-10-PCS | Mod: CPTII,S$GLB,, | Performed by: COLON & RECTAL SURGERY

## 2023-02-02 PROCEDURE — 99024 POSTOP FOLLOW-UP VISIT: CPT | Mod: S$GLB,,, | Performed by: COLON & RECTAL SURGERY

## 2023-02-02 PROCEDURE — 99024 PR POST-OP FOLLOW-UP VISIT: ICD-10-PCS | Mod: S$GLB,,, | Performed by: COLON & RECTAL SURGERY

## 2023-02-02 PROCEDURE — 3078F DIAST BP <80 MM HG: CPT | Mod: CPTII,S$GLB,, | Performed by: COLON & RECTAL SURGERY

## 2023-02-14 ENCOUNTER — TELEPHONE (OUTPATIENT)
Dept: SURGERY | Facility: CLINIC | Age: 63
End: 2023-02-14
Payer: COMMERCIAL

## 2023-02-14 DIAGNOSIS — K61.2 ANORECTAL ABSCESS: Primary | ICD-10-CM

## 2023-02-14 NOTE — TELEPHONE ENCOUNTER
Spoke with patient, He is scheduled for Mri tomorrow at 5p and with Dr Leung on Thursday. Patient verbalized understanding and locations were verified.

## 2023-02-14 NOTE — TELEPHONE ENCOUNTER
Spoke with patient. 6/10, drainage and bleeding. Appt scheduled for Thursday with Dr Leung. Will discuss with NP for advice.

## 2023-02-14 NOTE — TELEPHONE ENCOUNTER
----- Message from José Miguel uBrrell sent at 2/14/2023  9:01 AM CST -----  Contact: @[phone]  Pt is calling in to see if he can be seen tomorrow instead of 2/16 due him being in extreme pain, please call to discuss further.

## 2023-02-14 NOTE — TELEPHONE ENCOUNTER
Spoke with patient, patient needs to be scheduled for MRI and OV with Dr Leung on Thursday 2/16. Message sent to MRI for scheduling.

## 2023-02-15 ENCOUNTER — TELEPHONE (OUTPATIENT)
Dept: SURGERY | Facility: CLINIC | Age: 63
End: 2023-02-15
Payer: COMMERCIAL

## 2023-02-15 ENCOUNTER — HOSPITAL ENCOUNTER (OUTPATIENT)
Dept: RADIOLOGY | Facility: HOSPITAL | Age: 63
Discharge: HOME | End: 2023-02-15
Attending: NURSE PRACTITIONER
Payer: COMMERCIAL

## 2023-02-15 DIAGNOSIS — K61.2 ANORECTAL ABSCESS: ICD-10-CM

## 2023-02-15 PROCEDURE — 72197 MRI PELVIS W/O & W/DYE: CPT | Mod: 26,,, | Performed by: STUDENT IN AN ORGANIZED HEALTH CARE EDUCATION/TRAINING PROGRAM

## 2023-02-15 PROCEDURE — 72197 MRI PELVIS W/O & W/DYE: CPT | Mod: TC

## 2023-02-15 PROCEDURE — A9585 GADOBUTROL INJECTION: HCPCS | Performed by: NURSE PRACTITIONER

## 2023-02-15 PROCEDURE — 72197 MRI PELVIS W WO CONTRAST: ICD-10-PCS | Mod: 26,,, | Performed by: STUDENT IN AN ORGANIZED HEALTH CARE EDUCATION/TRAINING PROGRAM

## 2023-02-15 PROCEDURE — 25500020 PHARM REV CODE 255: Performed by: NURSE PRACTITIONER

## 2023-02-15 RX ORDER — GADOBUTROL 604.72 MG/ML
10 INJECTION INTRAVENOUS
Status: COMPLETED | OUTPATIENT
Start: 2023-02-15 | End: 2023-02-15

## 2023-02-15 RX ADMIN — GADOBUTROL 10 ML: 604.72 INJECTION INTRAVENOUS at 05:02

## 2023-02-15 NOTE — PROGRESS NOTES
"  CRS Office Visit Follow-up  Referring Md:   No referring provider defined for this encounter.    SUBJECTIVE:     Chief Complaint: Anorectal fistula    History of Present Illness:  Patient is a 62 y.o. male presents with Anorectal fistula. The patient is a established patient to this practice.  He underwent fistula closure with [facility] Surgi[facility] on 12/21/    Prior anorectal surgical history:  [facility] (Dr. David Wolf)   - 2/14/22 EUA + I&D abscess: 9x7cm perianal abscess extending into left buttock, 2 vessels loops placed via counterincisions for drainage   - 4/21/22 EUA + seton placement: Perianal fistula with external opening at 2:00, internal opening posterior midline at dentate. Draining silk seton placed.   - 5/25/22 EUA + hemorrhoidectomy + rectal mucosal advancement flap: 3 column internal hemorrhoidectomy with Ligasure, rectal mucosal advancement flap used to cover the posterior midline fistula, fistula tract packed with 1/2" iodoform gauze     [facility] (Madhu)   - 6/29/22 EUA + fistulotomy + seton placement: left anterolateral external opening which tracked posterior eventually to the midline above the dentate line, posterior midline intersphincteric fistula.         1/19/23 Follow up:  Drainage is decreasing, but still having to change pad 6-8 times a day due to drainage. He feels like there is something hard near his rectum. After bowel movements, experiences major pain that persists for long periods of time. He sometimes needs to sit in warm bath tub to help the pain subside.     2/2/23 Follow up: Pain improved - overall but not resolved.  Drainage has decreased some. He does feel overall better than prior to surgery.     2/26/23 Follow up: Still experiencing drainage and pain after bowel movements that persists. The drainage he is experiencing consists of blood, pus, and stool.    Review of Systems:  Review of Systems   Constitutional:  Negative for chills and fever.     OBJECTIVE:     Vital " "Signs (Most Recent)/66 (BP Location: Left arm, Patient Position: Sitting)   Pulse 67   Resp 19   Ht 5' 7" (1.702 m)   Wt 88.8 kg (195 lb 11.2 oz)   SpO2 95%   BMI 30.65 kg/m²       Physical Exam:  General: White male in no distress   Neuro: alert and oriented x 4.  Moves all extremities.     HEENT: no icterus.  Trachea midline  Respiratory: respirations are even and unlabored  Cardiac: regular rate  Abdomen: Soft, NT/ND  Extremities: Warm dry and intact  Skin: no rashes        ASSESSMENT/PLAN:     Diagnoses and all orders for this visit:    Perianal fistula  -     Case Request Operating Room: Exam under anesthesia  -     HYDROcodone-acetaminophen (NORCO) 5-325 mg per tablet; Take 2 tablets by mouth every 6 (six) hours as needed for Pain.    Anal fistula        62 y.o. male with anorectal fistula    EUA, possible seton, possible fistulotomy.  Hydrocodone given.              "

## 2023-02-15 NOTE — H&P (VIEW-ONLY)
"  CRS Office Visit Follow-up  Referring Md:   No referring provider defined for this encounter.    SUBJECTIVE:     Chief Complaint: Anorectal fistula    History of Present Illness:  Patient is a 62 y.o. male presents with Anorectal fistula. The patient is a established patient to this practice.  He underwent fistula closure with [facility] Surgi[facility] on 12/21/    Prior anorectal surgical history:  [facility] (Dr. David Wolf)   - 2/14/22 EUA + I&D abscess: 9x7cm perianal abscess extending into left buttock, 2 vessels loops placed via counterincisions for drainage   - 4/21/22 EUA + seton placement: Perianal fistula with external opening at 2:00, internal opening posterior midline at dentate. Draining silk seton placed.   - 5/25/22 EUA + hemorrhoidectomy + rectal mucosal advancement flap: 3 column internal hemorrhoidectomy with Ligasure, rectal mucosal advancement flap used to cover the posterior midline fistula, fistula tract packed with 1/2" iodoform gauze     [facility] (Madhu)   - 6/29/22 EUA + fistulotomy + seton placement: left anterolateral external opening which tracked posterior eventually to the midline above the dentate line, posterior midline intersphincteric fistula.         1/19/23 Follow up:  Drainage is decreasing, but still having to change pad 6-8 times a day due to drainage. He feels like there is something hard near his rectum. After bowel movements, experiences major pain that persists for long periods of time. He sometimes needs to sit in warm bath tub to help the pain subside.     2/2/23 Follow up: Pain improved - overall but not resolved.  Drainage has decreased some. He does feel overall better than prior to surgery.     2/26/23 Follow up: Still experiencing drainage and pain after bowel movements that persists. The drainage he is experiencing consists of blood, pus, and stool.    Review of Systems:  Review of Systems   Constitutional:  Negative for chills and fever.     OBJECTIVE:     Vital " "Signs (Most Recent)/66 (BP Location: Left arm, Patient Position: Sitting)   Pulse 67   Resp 19   Ht 5' 7" (1.702 m)   Wt 88.8 kg (195 lb 11.2 oz)   SpO2 95%   BMI 30.65 kg/m²       Physical Exam:  General: White male in no distress   Neuro: alert and oriented x 4.  Moves all extremities.     HEENT: no icterus.  Trachea midline  Respiratory: respirations are even and unlabored  Cardiac: regular rate  Abdomen: Soft, NT/ND  Extremities: Warm dry and intact  Skin: no rashes        ASSESSMENT/PLAN:     Diagnoses and all orders for this visit:    Perianal fistula  -     Case Request Operating Room: Exam under anesthesia  -     HYDROcodone-acetaminophen (NORCO) 5-325 mg per tablet; Take 2 tablets by mouth every 6 (six) hours as needed for Pain.    Anal fistula        62 y.o. male with anorectal fistula    EUA, possible seton, possible fistulotomy.  Hydrocodone given.              "

## 2023-02-16 ENCOUNTER — ANESTHESIA EVENT (OUTPATIENT)
Dept: SURGERY | Facility: HOSPITAL | Age: 63
End: 2023-02-16
Payer: COMMERCIAL

## 2023-02-16 ENCOUNTER — OFFICE VISIT (OUTPATIENT)
Dept: SURGERY | Facility: CLINIC | Age: 63
End: 2023-02-16
Payer: COMMERCIAL

## 2023-02-16 VITALS
WEIGHT: 195.69 LBS | BODY MASS INDEX: 30.71 KG/M2 | HEART RATE: 67 BPM | SYSTOLIC BLOOD PRESSURE: 126 MMHG | RESPIRATION RATE: 19 BRPM | HEIGHT: 67 IN | DIASTOLIC BLOOD PRESSURE: 66 MMHG | OXYGEN SATURATION: 95 %

## 2023-02-16 DIAGNOSIS — K60.3 PERIANAL FISTULA: Primary | ICD-10-CM

## 2023-02-16 DIAGNOSIS — K60.3 ANAL FISTULA: ICD-10-CM

## 2023-02-16 PROCEDURE — 4010F ACE/ARB THERAPY RXD/TAKEN: CPT | Mod: CPTII,S$GLB,, | Performed by: COLON & RECTAL SURGERY

## 2023-02-16 PROCEDURE — 3008F BODY MASS INDEX DOCD: CPT | Mod: CPTII,S$GLB,, | Performed by: COLON & RECTAL SURGERY

## 2023-02-16 PROCEDURE — 99999 PR PBB SHADOW E&M-EST. PATIENT-LVL V: ICD-10-PCS | Mod: PBBFAC,,, | Performed by: COLON & RECTAL SURGERY

## 2023-02-16 PROCEDURE — 4010F PR ACE/ARB THEARPY RXD/TAKEN: ICD-10-PCS | Mod: CPTII,S$GLB,, | Performed by: COLON & RECTAL SURGERY

## 2023-02-16 PROCEDURE — 3074F SYST BP LT 130 MM HG: CPT | Mod: CPTII,S$GLB,, | Performed by: COLON & RECTAL SURGERY

## 2023-02-16 PROCEDURE — 99999 PR PBB SHADOW E&M-EST. PATIENT-LVL V: CPT | Mod: PBBFAC,,, | Performed by: COLON & RECTAL SURGERY

## 2023-02-16 PROCEDURE — 3078F PR MOST RECENT DIASTOLIC BLOOD PRESSURE < 80 MM HG: ICD-10-PCS | Mod: CPTII,S$GLB,, | Performed by: COLON & RECTAL SURGERY

## 2023-02-16 PROCEDURE — 1159F PR MEDICATION LIST DOCUMENTED IN MEDICAL RECORD: ICD-10-PCS | Mod: CPTII,S$GLB,, | Performed by: COLON & RECTAL SURGERY

## 2023-02-16 PROCEDURE — 3008F PR BODY MASS INDEX (BMI) DOCUMENTED: ICD-10-PCS | Mod: CPTII,S$GLB,, | Performed by: COLON & RECTAL SURGERY

## 2023-02-16 PROCEDURE — 99024 PR POST-OP FOLLOW-UP VISIT: ICD-10-PCS | Mod: S$GLB,,, | Performed by: COLON & RECTAL SURGERY

## 2023-02-16 PROCEDURE — 3074F PR MOST RECENT SYSTOLIC BLOOD PRESSURE < 130 MM HG: ICD-10-PCS | Mod: CPTII,S$GLB,, | Performed by: COLON & RECTAL SURGERY

## 2023-02-16 PROCEDURE — 99024 POSTOP FOLLOW-UP VISIT: CPT | Mod: S$GLB,,, | Performed by: COLON & RECTAL SURGERY

## 2023-02-16 PROCEDURE — 1159F MED LIST DOCD IN RCRD: CPT | Mod: CPTII,S$GLB,, | Performed by: COLON & RECTAL SURGERY

## 2023-02-16 PROCEDURE — 3078F DIAST BP <80 MM HG: CPT | Mod: CPTII,S$GLB,, | Performed by: COLON & RECTAL SURGERY

## 2023-02-16 RX ORDER — HYDROCODONE BITARTRATE AND ACETAMINOPHEN 5; 325 MG/1; MG/1
2 TABLET ORAL EVERY 6 HOURS PRN
Qty: 30 TABLET | Refills: 0 | Status: SHIPPED | OUTPATIENT
Start: 2023-02-16 | End: 2023-02-24 | Stop reason: ALTCHOICE

## 2023-02-16 NOTE — ANESTHESIA PREPROCEDURE EVALUATION
[facility]  Anesthesia Pre-Operative Evaluation         Patient Name: Jimmy Shaikh  YOB: 1960  MRN: [id number]    SUBJECTIVE:     Pre-operative evaluation for Procedure(s) (LRB):  Exam under anesthesia (N/A)  Scheduled for 2/17/2023    HPI 02/16/2023:  Jimmy Shaikh is a 62 y.o. male with hx of HTN, obesity. Has history of anorectal fistula s/p closure. Still having drainage and pain after BM.     Presents now for above procedure.      Prev airway:   Induction:  Intravenous    Intubated:  Postinduction    Mask Ventilation:  Easy with oral airway    Attempts:  1    Attempted By:  Resident anesthesiologist    Method of Intubation:  Direct    Blade:  Amos 3    Laryngeal View Grade: Grade IIb - only the arytenoids and epiglottis seen      Difficult Airway Encountered?: No      Complications:  None    Airway Device:  Oral endotracheal tube    Airway Device Size:  7.5    Style/Cuff Inflation:  Cuffed (inflated to minimal occlusive pressure)    Oxygen/Ventilation Requirements:  On room air        Patient Active Problem List   Diagnosis    Dysphagia, pharyngoesophageal    Perirectal abscess    Perianal fistula       Review of patient's allergies indicates:  No Known Allergies    Outpatient Medications:  Current Facility-Administered Medications on File Prior to Encounter   Medication Dose Route Frequency Provider Last Rate Last Admin    0.9%  NaCl infusion   Intravenous Continuous Romario Forbes MD   Stopped at 12/15/21 1055    0.9%  NaCl infusion   Intravenous Continuous Kathi Gallagher NP        acetaminophen tablet 650 mg  650 mg Oral Once PRN Nils Mckinnon Jr., MD        lactated ringers infusion   Intravenous Continuous Willis Giron MD 0 mL/hr at 02/14/22 1130 New Bag at 05/25/22 1206    mupirocin 2 % ointment   Nasal On Call Procedure Kathi Gallagher NP   Given at 06/29/22 0645     Current Outpatient Medications on File Prior to  Encounter   Medication Sig Dispense Refill    acetaminophen (TYLENOL) 500 MG tablet Take 2 tablets (1,000 mg total) by mouth every 8 (eight) hours. Please take every 8 hours scheduled for 1 week. Take as needed after 1 week if pain well controlled. 60 tablet 2    aspirin (ECOTRIN) 81 MG EC tablet Take 81 mg by mouth once daily.      atorvastatin (LIPITOR) 20 MG tablet Take 20 mg by mouth every evening.      esomeprazole (NEXIUM) 20 MG capsule Take 20 mg by mouth every evening.      gabapentin (NEURONTIN) 100 MG capsule Take 2 capsules (200 mg total) by mouth every 8 (eight) hours. Please take every 8 hours scheduled for 1 week. Take as needed after 1 week if pain well controlled. 42 capsule 0    HYDROcodone-acetaminophen (NORCO) 5-325 mg per tablet Take 2 tablets by mouth every 6 (six) hours as needed for Pain. 30 tablet 0    ibuprofen (ADVIL,MOTRIN) 800 MG tablet Take 1 tablet (800 mg total) by mouth every 8 (eight) hours. Please take every 8 hours scheduled for 1 week. Take as needed after 1 week if pain well controlled. 60 tablet 2    lisinopril 10 MG tablet Take 10 mg by mouth every evening.      oxyCODONE (ROXICODONE) 5 MG immediate release tablet Take 1 tablet (5 mg total) by mouth every 6 (six) hours as needed for Pain. 20 tablet 0    pantoprazole (PROTONIX) 40 MG tablet Take 40 mg by mouth every evening.      polyethylene glycol (GLYCOLAX) 17 gram/dose powder Take 17 g by mouth nightly as needed (to help prevent contipation). 510 g 0        Current Inpatient Medications:      Past Surgical History:   Procedure Laterality Date    ANTERIOR CRUCIATE LIGAMENT REPAIR Left     BONE CYST EXCISION      neck    CLUB FOOT RELEASE Bilateral     COLONOSCOPY N/A 03/10/2017    Procedure: COLONOSCOPY;  Surgeon: Romario Forbes MD;  [facility];  Service: Endoscopy;  Laterality: N/A;    COLONOSCOPY N/A 12/15/2021    Procedure: COLONOSCOPY;  Surgeon: Romario Forbes MD;  Location: [facility]  ENDO;  Service: Endoscopy;  Laterality: N/A;    COSMETIC SURGERY      deviated septum    CYST REMOVAL      on neck    ESOPHAGOGASTRODUODENOSCOPY (EGD) WITH DILATION      EXAMINATION UNDER ANESTHESIA Left 2022    Procedure: Exam under anesthesia;  Surgeon: Casper Wolf MD;  Location: [facility] OR;  Service: General;  Laterality: Left;    EYE SURGERY      lasik    FACIAL RECONSTRUCTION Left     CHEEK BONE    INCISION AND DRAINAGE OF PERIANAL FISTULA Left 2022    Procedure: INCISION AND DRAINAGE, FISTULA, PERIANAL;  Surgeon: Casper Wolf MD;  Location: [facility] OR;  Service: General;  Laterality: Left;    INCISION AND DRAINAGE OF PERIRECTAL REGION N/A 2022    Procedure: INCISION AND DRAINAGE, PERIRECTAL REGION;  Surgeon: Casper Wolf MD;  Location: [facility] OR;  Service: General;  Laterality: N/A;  8:30 per MD    INSERTION OF SETON STITCH N/A 2022    Procedure: PLACEMENT, SETON STITCH;  Surgeon: Esteban Leung MD;  Location: [facility] OR 2ND FLR;  Service: Colon and Rectal;  Laterality: N/A;    MANDIBLE FRACTURE SURGERY      RECTAL FISTULOTOMY N/A 2022    Procedure: FISTULOTOMY, RECTUM;  Surgeon: Esteban Leung MD;  Location: [facility] OR 2ND FLR;  Service: Colon and Rectal;  Laterality: N/A;    REPAIR OF ANAL FISTULA N/A 2022    Procedure: CLOSURE, FISTULA, ANAL;  Surgeon: Casper Wolf MD;  Location: [facility] OR;  Service: General;  Laterality: N/A;  10am per MD    REPLACEMENT-HOMOGRAFT N/A 2022    Procedure: REPLACEMENT-HOMOGRAFT;  Surgeon: Esteban Leung MD;  Location: [facility] OR 2ND FLR;  Service: Colon and Rectal;  Laterality: N/A;  76 centimeters    UPPER GASTROINTESTINAL ENDOSCOPY         Social History     Socioeconomic History    Marital status:    Tobacco Use    Smoking status: Former     Packs/day: 1.00     Years: 25.00     Pack years: 25.00     Types: Cigarettes     Quit date:      Years since quittin.1    Smokeless tobacco: Never    Tobacco  comments:     quit 2009   Substance and Sexual Activity    Alcohol use: Yes     Comment: ocasionally    Drug use: No       OBJECTIVE:     Weight:  Wt Readings from Last 1 Encounters:   02/16/23 88.8 kg (195 lb 11.2 oz)     There is no height or weight on file to calculate BMI.    Recent Blood Pressure Readings:  BP Readings from Last 3 Encounters:   02/16/23 126/66   02/02/23 134/74   01/19/23 136/84       Vital Signs Range (Last 24H):  Pulse:  [67]   Resp:  [19]   BP: (126)/(66)   SpO2:  [95 %]       CBC:   Lab Results   Component Value Date    WBC 7.60 05/24/2022    HGB 14.5 05/24/2022    HCT 41.9 05/24/2022    MCV 87 05/24/2022     05/24/2022       CMP:     Chemistry        Component Value Date/Time     05/24/2022 1334    K 4.2 05/24/2022 1334     05/24/2022 1334    CO2 25 05/24/2022 1334    BUN 13 05/24/2022 1334    CREATININE 0.93 05/24/2022 1334     (H) 05/24/2022 1334        Component Value Date/Time    CALCIUM 9.5 05/24/2022 1334    ALKPHOS 98 05/24/2022 1334    AST 35 05/24/2022 1334    ALT 44 05/24/2022 1334    BILITOT 0.5 05/24/2022 1334    ESTGFRAFRICA >60 05/24/2022 1334    EGFRNONAA >60 05/24/2022 1334            INR:  Lab Results   Component Value Date    INR 1.1 11/27/2021       Diagnostic Studies:      EKG:    No results found for this or any previous visit.    2D Echo:    No results found for this or any previous visit.    No results found for this or any previous visit.    No results found for this or any previous visit.      ASSESSMENT/PLAN:           Pre-op Assessment    I have reviewed the Patient Summary Reports.    I have reviewed the NPO Status.      Review of Systems  Anesthesia Hx:  History of prior surgery of interest to airway management or planning: Denies Family Hx of Anesthesia complications.   Denies Personal Hx of Anesthesia complications.   Social:  Former Smoker    Cardiovascular:   Hypertension  Denies CHF.    Pulmonary:   Denies COPD.  Denies Asthma.     Hepatic/GI:   GERD    Neurological:   Denies TIA. Denies CVA.    Endocrine:  Obesity / BMI > 30      Physical Exam  General: Well nourished, Cooperative, Alert and Oriented    Airway:  Mallampati: III / III  Mouth Opening: Normal  TM Distance: Normal  Tongue: Normal  Neck ROM: Normal ROM    Dental:  Intact    Chest/Lungs:  Clear to auscultation, Normal Respiratory Rate    Heart:  Rate: Normal  Rhythm: Regular Rhythm        Anesthesia Plan  Type of Anesthesia, risks & benefits discussed:    Anesthesia Type: Gen ETT, Gen Supraglottic Airway, Gen Natural Airway  Intra-op Monitoring Plan: Standard ASA Monitors  Post Op Pain Control Plan: multimodal analgesia and IV/PO Opioids PRN  Induction:  IV  Airway Plan: Direct and Video, Post-Induction  Informed Consent: Informed consent signed with the Patient and all parties understand the risks and agree with anesthesia plan.  All questions answered.   ASA Score: 2  Day of Surgery Review of History & Physical: H&P Update referred to the surgeon/provider.    Ready For Surgery From Anesthesia Perspective.     .

## 2023-02-17 ENCOUNTER — HOSPITAL ENCOUNTER (OUTPATIENT)
Facility: HOSPITAL | Age: 63
Discharge: HOME | End: 2023-02-17
Attending: COLON & RECTAL SURGERY | Admitting: COLON & RECTAL SURGERY
Payer: COMMERCIAL

## 2023-02-17 ENCOUNTER — ANESTHESIA (OUTPATIENT)
Dept: SURGERY | Facility: HOSPITAL | Age: 63
End: 2023-02-17
Payer: COMMERCIAL

## 2023-02-17 VITALS
OXYGEN SATURATION: 93 % | RESPIRATION RATE: 18 BRPM | HEIGHT: 67 IN | HEART RATE: 78 BPM | TEMPERATURE: 98 F | BODY MASS INDEX: 30.72 KG/M2 | WEIGHT: 195.75 LBS | SYSTOLIC BLOOD PRESSURE: 138 MMHG | DIASTOLIC BLOOD PRESSURE: 80 MMHG

## 2023-02-17 DIAGNOSIS — K60.3 PERIANAL FISTULA: Primary | ICD-10-CM

## 2023-02-17 DIAGNOSIS — K60.3 ANAL FISTULA: ICD-10-CM

## 2023-02-17 PROCEDURE — 25000003 PHARM REV CODE 250: Performed by: NURSE PRACTITIONER

## 2023-02-17 PROCEDURE — 36000704 HC OR TIME LEV I 1ST 15 MIN: Performed by: COLON & RECTAL SURGERY

## 2023-02-17 PROCEDURE — 63600175 PHARM REV CODE 636 W HCPCS: Performed by: ANESTHESIOLOGY

## 2023-02-17 PROCEDURE — 25000003 PHARM REV CODE 250: Performed by: STUDENT IN AN ORGANIZED HEALTH CARE EDUCATION/TRAINING PROGRAM

## 2023-02-17 PROCEDURE — 46280 REMOVE ANAL FIST COMPLEX: CPT | Mod: ,,, | Performed by: COLON & RECTAL SURGERY

## 2023-02-17 PROCEDURE — 36000705 HC OR TIME LEV I EA ADD 15 MIN: Performed by: COLON & RECTAL SURGERY

## 2023-02-17 PROCEDURE — 71000015 HC POSTOP RECOV 1ST HR: Performed by: COLON & RECTAL SURGERY

## 2023-02-17 PROCEDURE — 63600175 PHARM REV CODE 636 W HCPCS: Performed by: STUDENT IN AN ORGANIZED HEALTH CARE EDUCATION/TRAINING PROGRAM

## 2023-02-17 PROCEDURE — 25000003 PHARM REV CODE 250: Performed by: COLON & RECTAL SURGERY

## 2023-02-17 PROCEDURE — D9220A PRA ANESTHESIA: Mod: ,,, | Performed by: ANESTHESIOLOGY

## 2023-02-17 PROCEDURE — D9220A PRA ANESTHESIA: ICD-10-PCS | Mod: ,,, | Performed by: ANESTHESIOLOGY

## 2023-02-17 PROCEDURE — 46280 PR REMOVAL ANAL FISTULA,TRANS/SUPRA/EXTRAPHINCT, +/-SETON: ICD-10-PCS | Mod: ,,, | Performed by: COLON & RECTAL SURGERY

## 2023-02-17 PROCEDURE — 37000008 HC ANESTHESIA 1ST 15 MINUTES: Performed by: COLON & RECTAL SURGERY

## 2023-02-17 PROCEDURE — 25000003 PHARM REV CODE 250

## 2023-02-17 PROCEDURE — 71000044 HC DOSC ROUTINE RECOVERY FIRST HOUR: Performed by: COLON & RECTAL SURGERY

## 2023-02-17 PROCEDURE — 71000016 HC POSTOP RECOV ADDL HR: Performed by: COLON & RECTAL SURGERY

## 2023-02-17 PROCEDURE — 37000009 HC ANESTHESIA EA ADD 15 MINS: Performed by: COLON & RECTAL SURGERY

## 2023-02-17 RX ORDER — PHENYLEPHRINE HCL IN 0.9% NACL 1 MG/10 ML
SYRINGE (ML) INTRAVENOUS
Status: DISCONTINUED | OUTPATIENT
Start: 2023-02-17 | End: 2023-02-17

## 2023-02-17 RX ORDER — DEXAMETHASONE SODIUM PHOSPHATE 4 MG/ML
INJECTION, SOLUTION INTRA-ARTICULAR; INTRALESIONAL; INTRAMUSCULAR; INTRAVENOUS; SOFT TISSUE
Status: DISCONTINUED | OUTPATIENT
Start: 2023-02-17 | End: 2023-02-17

## 2023-02-17 RX ORDER — DEXMEDETOMIDINE HYDROCHLORIDE 100 UG/ML
INJECTION, SOLUTION INTRAVENOUS
Status: DISCONTINUED | OUTPATIENT
Start: 2023-02-17 | End: 2023-02-17

## 2023-02-17 RX ORDER — ONDANSETRON 2 MG/ML
INJECTION INTRAMUSCULAR; INTRAVENOUS
Status: DISCONTINUED | OUTPATIENT
Start: 2023-02-17 | End: 2023-02-17

## 2023-02-17 RX ORDER — MUPIROCIN 20 MG/G
OINTMENT TOPICAL
Status: DISCONTINUED | OUTPATIENT
Start: 2023-02-17 | End: 2023-02-17 | Stop reason: HOSPADM

## 2023-02-17 RX ORDER — ACETAMINOPHEN 500 MG
1000 TABLET ORAL
Status: COMPLETED | OUTPATIENT
Start: 2023-02-17 | End: 2023-02-17

## 2023-02-17 RX ORDER — HYDROMORPHONE HYDROCHLORIDE 1 MG/ML
0.2 INJECTION, SOLUTION INTRAMUSCULAR; INTRAVENOUS; SUBCUTANEOUS EVERY 5 MIN PRN
Status: DISCONTINUED | OUTPATIENT
Start: 2023-02-17 | End: 2023-02-17 | Stop reason: HOSPADM

## 2023-02-17 RX ORDER — MIDAZOLAM HYDROCHLORIDE 1 MG/ML
INJECTION, SOLUTION INTRAMUSCULAR; INTRAVENOUS
Status: DISCONTINUED | OUTPATIENT
Start: 2023-02-17 | End: 2023-02-17

## 2023-02-17 RX ORDER — OXYCODONE AND ACETAMINOPHEN 5; 325 MG/1; MG/1
2 TABLET ORAL ONCE AS NEEDED
Status: COMPLETED | OUTPATIENT
Start: 2023-02-17 | End: 2023-02-17

## 2023-02-17 RX ORDER — PROPOFOL 10 MG/ML
VIAL (ML) INTRAVENOUS
Status: DISCONTINUED | OUTPATIENT
Start: 2023-02-17 | End: 2023-02-17

## 2023-02-17 RX ORDER — HALOPERIDOL 5 MG/ML
0.5 INJECTION INTRAMUSCULAR EVERY 10 MIN PRN
Status: DISCONTINUED | OUTPATIENT
Start: 2023-02-17 | End: 2023-02-17 | Stop reason: HOSPADM

## 2023-02-17 RX ORDER — KETOROLAC TROMETHAMINE 30 MG/ML
30 INJECTION, SOLUTION INTRAMUSCULAR; INTRAVENOUS ONCE AS NEEDED
Status: COMPLETED | OUTPATIENT
Start: 2023-02-17 | End: 2023-02-17

## 2023-02-17 RX ORDER — FENTANYL CITRATE 50 UG/ML
INJECTION, SOLUTION INTRAMUSCULAR; INTRAVENOUS
Status: DISCONTINUED | OUTPATIENT
Start: 2023-02-17 | End: 2023-02-17

## 2023-02-17 RX ORDER — BUPIVACAINE HYDROCHLORIDE 5 MG/ML
INJECTION, SOLUTION EPIDURAL; INTRACAUDAL
Status: DISCONTINUED | OUTPATIENT
Start: 2023-02-17 | End: 2023-02-17 | Stop reason: HOSPADM

## 2023-02-17 RX ORDER — ROCURONIUM BROMIDE 10 MG/ML
INJECTION, SOLUTION INTRAVENOUS
Status: DISCONTINUED | OUTPATIENT
Start: 2023-02-17 | End: 2023-02-17

## 2023-02-17 RX ORDER — LIDOCAINE HYDROCHLORIDE 20 MG/ML
INJECTION, SOLUTION EPIDURAL; INFILTRATION; INTRACAUDAL; PERINEURAL
Status: DISCONTINUED | OUTPATIENT
Start: 2023-02-17 | End: 2023-02-17

## 2023-02-17 RX ORDER — SODIUM CHLORIDE 9 MG/ML
INJECTION, SOLUTION INTRAVENOUS CONTINUOUS
Status: DISCONTINUED | OUTPATIENT
Start: 2023-02-17 | End: 2023-02-17 | Stop reason: HOSPADM

## 2023-02-17 RX ORDER — OXYCODONE HYDROCHLORIDE 10 MG/1
10 TABLET ORAL ONCE
Status: DISCONTINUED | OUTPATIENT
Start: 2023-02-17 | End: 2023-02-17 | Stop reason: HOSPADM

## 2023-02-17 RX ADMIN — Medication 100 MCG: at 12:02

## 2023-02-17 RX ADMIN — HYDROMORPHONE HYDROCHLORIDE 0.2 MG: 1 INJECTION, SOLUTION INTRAMUSCULAR; INTRAVENOUS; SUBCUTANEOUS at 02:02

## 2023-02-17 RX ADMIN — LIDOCAINE HYDROCHLORIDE 100 MG: 20 INJECTION, SOLUTION EPIDURAL; INFILTRATION; INTRACAUDAL; PERINEURAL at 12:02

## 2023-02-17 RX ADMIN — MUPIROCIN: 20 OINTMENT TOPICAL at 10:02

## 2023-02-17 RX ADMIN — DEXMEDETOMIDINE 8 MCG: 100 INJECTION, SOLUTION, CONCENTRATE INTRAVENOUS at 12:02

## 2023-02-17 RX ADMIN — OXYCODONE HYDROCHLORIDE AND ACETAMINOPHEN 2 TABLET: 5; 325 TABLET ORAL at 01:02

## 2023-02-17 RX ADMIN — HYDROMORPHONE HYDROCHLORIDE 0.2 MG: 1 INJECTION, SOLUTION INTRAMUSCULAR; INTRAVENOUS; SUBCUTANEOUS at 01:02

## 2023-02-17 RX ADMIN — Medication 200 MCG: at 12:02

## 2023-02-17 RX ADMIN — PROPOFOL 200 MG: 10 INJECTION, EMULSION INTRAVENOUS at 12:02

## 2023-02-17 RX ADMIN — SUGAMMADEX 200 MG: 100 INJECTION, SOLUTION INTRAVENOUS at 01:02

## 2023-02-17 RX ADMIN — SODIUM CHLORIDE: 0.9 INJECTION, SOLUTION INTRAVENOUS at 12:02

## 2023-02-17 RX ADMIN — ROCURONIUM BROMIDE 50 MG: 10 INJECTION, SOLUTION INTRAVENOUS at 12:02

## 2023-02-17 RX ADMIN — SODIUM CHLORIDE, SODIUM GLUCONATE, SODIUM ACETATE, POTASSIUM CHLORIDE, MAGNESIUM CHLORIDE, SODIUM PHOSPHATE, DIBASIC, AND POTASSIUM PHOSPHATE: .53; .5; .37; .037; .03; .012; .00082 INJECTION, SOLUTION INTRAVENOUS at 12:02

## 2023-02-17 RX ADMIN — DEXAMETHASONE SODIUM PHOSPHATE 4 MG: 4 INJECTION INTRA-ARTICULAR; INTRALESIONAL; INTRAMUSCULAR; INTRAVENOUS; SOFT TISSUE at 12:02

## 2023-02-17 RX ADMIN — ONDANSETRON 4 MG: 2 INJECTION INTRAMUSCULAR; INTRAVENOUS at 01:02

## 2023-02-17 RX ADMIN — KETOROLAC TROMETHAMINE 30 MG: 30 INJECTION, SOLUTION INTRAMUSCULAR; INTRAVENOUS at 03:02

## 2023-02-17 RX ADMIN — ROCURONIUM BROMIDE 10 MG: 10 INJECTION, SOLUTION INTRAVENOUS at 12:02

## 2023-02-17 RX ADMIN — MIDAZOLAM 2 MG: 1 INJECTION INTRAMUSCULAR; INTRAVENOUS at 12:02

## 2023-02-17 RX ADMIN — ACETAMINOPHEN 1000 MG: 500 TABLET ORAL at 10:02

## 2023-02-17 RX ADMIN — FENTANYL CITRATE 100 MCG: 50 INJECTION INTRAMUSCULAR; INTRAVENOUS at 12:02

## 2023-02-17 NOTE — ANESTHESIA PROCEDURE NOTES
Intubation    Date/Time: 2/17/2023 12:09 PM  Performed by: Deepa Woods DO  Authorized by: Xiang Wall MD     Intubation:     Induction:  Intravenous    Intubated:  Postinduction    Mask Ventilation:  Easy mask    Attempts:  1    Attempted By:  Resident anesthesiologist    Method of Intubation:  Direct    Blade:  Amos 3    Laryngeal View Grade: Grade I - full view of cords      Difficult Airway Encountered?: No      Complications:  None    Airway Device:  Oral endotracheal tube    Airway Device Size:  7.5    Style/Cuff Inflation:  Cuffed    Placement Verified By:  Capnometry    Complicating Factors:  None    Findings Post-Intubation:  BS equal bilateral and atraumatic/condition of teeth unchanged

## 2023-02-17 NOTE — TRANSFER OF CARE
"Anesthesia Transfer of Care Note    Patient: Jimmy Shaikh    Procedure(s) Performed: Procedure(s) (LRB):  Exam under anesthesia (N/A)  FISTULECTOMY (N/A)  PLACEMENT, SETON STITCH    Patient location: PACU    Anesthesia Type: general    Transport from OR: Transported from OR on 6-10 L/min O2 by face mask with adequate spontaneous ventilation    Post pain: adequate analgesia    Post assessment: no apparent anesthetic complications and tolerated procedure well    Post vital signs: stable    Level of consciousness: awake and alert    Nausea/Vomiting: no nausea/vomiting    Complications: none    Transfer of care protocol was followed      Last vitals:   Visit Vitals  /67   Pulse 72   Temp 36.7 °C (98.1 °F) (Oral)   Resp 14   Ht 5' 7" (1.702 m)   Wt 88.8 kg (195 lb 12.3 oz)   SpO2 96%   BMI 30.66 kg/m²     "

## 2023-02-17 NOTE — BRIEF OP NOTE
Davi Khan - Surgery ([facility])  Brief Operative Note    Surgery Date: 2/17/2023     Surgeon(s) and Role:     * Esteban Leung MD - Primary     * Zeinab Lopez MD - Resident - Assisting     * Lashanda Rojas MD - Resident - Chief        Pre-op Diagnosis:  Perianal fistula [K60.3]    Post-op Diagnosis:  Post-Op Diagnosis Codes:     * Perianal fistula [K60.3]    Procedure(s) (LRB):  Exam under anesthesia (N/A)  FISTULECTOMY (N/A)  PLACEMENT, SETON STITCH    Anesthesia: General    Operative Findings: seton placed from left anterior external opening to deep in the left lateral rectum. Skin bridge under the seton was divided so it could be tightened.     Estimated Blood Loss: * No values recorded between 2/17/2023 12:29 PM and 2/17/2023  1:22 PM *         Specimens:   Specimen (24h ago, onward)      None              Discharge Note    OUTCOME: Patient tolerated treatment/procedure well without complication and is now ready for discharge.    DISPOSITION: Home or Self Care    FINAL DIAGNOSIS:  Perianal fistula    FOLLOWUP: In clinic    DISCHARGE INSTRUCTIONS:    Discharge Procedure Orders   Lifting restrictions   Order Comments: No heavy lifting greater than 10 pounds (about the weight of a gallon of milk) for 6 week postoperatively. This is to prevent new/recurrent hernia at your incision sites while they heal.     No driving until:   Order Comments: While taking narcotic pain medications.     Notify your health care provider if you experience any of the following:  temperature >100.4     Notify your health care provider if you experience any of the following:  persistent nausea and vomiting or diarrhea     Notify your health care provider if you experience any of the following:  severe uncontrolled pain     Notify your health care provider if you experience any of the following:  redness, tenderness, or signs of infection (pain, swelling, redness, odor or green/yellow discharge around incision site)         Clinical Reference Documents Added to Patient Instructions         Document    ANAL ABSCESS AND FISTULA DISCHARGE INSTRUCTIONS, ADULT (ENGLISH)

## 2023-02-19 ENCOUNTER — HOSPITAL ENCOUNTER (EMERGENCY)
Facility: HOSPITAL | Age: 63
Discharge: HOME | End: 2023-02-19
Attending: STUDENT IN AN ORGANIZED HEALTH CARE EDUCATION/TRAINING PROGRAM
Payer: COMMERCIAL

## 2023-02-19 DIAGNOSIS — Z48.89 ENCOUNTER FOR POST SURGICAL WOUND CHECK: Primary | ICD-10-CM

## 2023-02-19 PROCEDURE — 99283 EMERGENCY DEPT VISIT LOW MDM: CPT | Mod: 25

## 2023-02-19 PROCEDURE — 25000003 PHARM REV CODE 250: Performed by: STUDENT IN AN ORGANIZED HEALTH CARE EDUCATION/TRAINING PROGRAM

## 2023-02-19 RX ORDER — OXYCODONE AND ACETAMINOPHEN 5; 325 MG/1; MG/1
1 TABLET ORAL
Status: COMPLETED | OUTPATIENT
Start: 2023-02-19 | End: 2023-02-19

## 2023-02-19 RX ADMIN — OXYCODONE HYDROCHLORIDE AND ACETAMINOPHEN 1 TABLET: 5; 325 TABLET ORAL at 08:02

## 2023-02-19 RX ADMIN — OXYCODONE HYDROCHLORIDE AND ACETAMINOPHEN 1 TABLET: 5; 325 TABLET ORAL at 07:02

## 2023-02-20 VITALS
BODY MASS INDEX: 31.35 KG/M2 | RESPIRATION RATE: 20 BRPM | HEART RATE: 59 BPM | TEMPERATURE: 99 F | SYSTOLIC BLOOD PRESSURE: 130 MMHG | WEIGHT: 199.75 LBS | HEIGHT: 67 IN | OXYGEN SATURATION: 98 % | DIASTOLIC BLOOD PRESSURE: 72 MMHG
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
## 2023-02-20 NOTE — OP NOTE
JIMMY SHAIKH  [id number]  [id number]    OPERATIVE NOTE:    DATE OF OPERATION: 02/17/2023    PREOPERATIVE DIAGNOSIS: Anal fistula, high transsphincteric    POSTOPERATIVE DIAGNOSIS: Anal fistula, high transsphincteric    PROCEDURE PERFORMED: Fistulectomy, seton placement.     ATTENDING SURGEON: Esteban Leung MD    RESIDENT/FELLOW SURGEON: Lashanda Saunders MD    ANESTHESIA: GETA    ESTIMATED BLOOD LOSS: 25 mL    FINDINGS:  1. High transsphincteric fistula, with internal opening posterior midline    SPECIMENS:   None    COMPLICATIONS:none    DISPOSITION: PACU    CONDITION: good    INDICATION FOR PROCEDURE:  Jimmy Shaikh is a 62 y.o. male with a chronic recurrent anal fistula.     DESCRIPTION OF PROCEDURE:    After obtaining informed consent the patient was taken to the operating room and placed supine. He underwent general endotracheal anesthesia and was then placed prone.  He was prepped and draped in the sterile manner.  A preoperative timeout was performed.    The external opening was intubated with a fistula probe and the internal opening was identified.  The fistula tract from the external opening to the external sphincter was mobilized using electrocautery.   At this point the probe was passed again and a vessel loop was passed to act as a seton.  The portion of the fistula tract that had been mobilized was excised. The skin overlying the external sphincter was divided. The seton was tightened be tying it to itself using a silk suture.   Anal block was performed  with Marcaine.  Hemostasis was ensured and a sterile dressing was applied.       Esteban Leung MD  , Colon & Rectal Surgery

## 2023-02-20 NOTE — ANESTHESIA POSTPROCEDURE EVALUATION
Anesthesia Post Evaluation    Patient: Jimmy Shaikh    Procedure(s) Performed: Procedure(s) (LRB):  Exam under anesthesia (N/A)  FISTULECTOMY (N/A)  PLACEMENT, SETON STITCH    Final Anesthesia Type: general      Patient location during evaluation: PACU  Patient participation: Yes- Able to Participate  Level of consciousness: awake and alert and oriented  Post-procedure vital signs: reviewed and stable  Pain management: adequate  Airway patency: patent  KIRSTIE mitigation strategies: Intraoperative administration of CPAP, nasopharyngeal airway, or oral appliance during sedation, Multimodal analgesia, Extubation while patient is awake, Verification of full reversal of neuromuscular block and Extubation and recovery carried out in lateral, semiupright, or other nonsupine position  PONV status at discharge: No PONV  Anesthetic complications: no      Cardiovascular status: hemodynamically stable  Respiratory status: unassisted  Hydration status: euvolemic  Follow-up not needed.          Vitals Value Taken Time   /80 02/17/23 1531   Temp 36.7 °C (98 °F) 02/17/23 1545   Pulse 75 02/17/23 1550   Resp 18 02/17/23 1545   SpO2 96 % 02/17/23 1547   Vitals shown include unvalidated device data.      No case tracking events are documented in the log.      Pain/Amparo Score: Pain Rating Prior to Med Admin: 7 (2/19/2023  8:40 PM)        
NASAL CONGESTION

## 2023-02-22 ENCOUNTER — PATIENT MESSAGE (OUTPATIENT)
Dept: SURGERY | Facility: CLINIC | Age: 63
End: 2023-02-22
Payer: COMMERCIAL

## 2023-02-23 NOTE — TELEPHONE ENCOUNTER
Called pt to check and see if he had a preference for pain medication.  Pt stated that he would take Oxy or Norco, it does not matter.  He would like some assistance with pain relief and is not particular.  Message sent to JONNIE Funes NP for refill.  Pt verbalized understanding to all.

## 2023-02-24 RX ORDER — OXYCODONE HYDROCHLORIDE 5 MG/1
5 TABLET ORAL EVERY 6 HOURS PRN
Qty: 20 TABLET | Refills: 0 | Status: SHIPPED | OUTPATIENT
Start: 2023-02-24 | End: 2023-03-09 | Stop reason: SDUPTHER

## 2023-02-24 NOTE — TELEPHONE ENCOUNTER
Called pt to check on him and see how he was doing after he messaged.  Josey is preparing his Oxy script at this time.  Pt is currently not febrile, but is experiencing a cough, sore throat, congestion and slight wheezing.  Pt does not have asthma and is not SOB at this time.  Covid negative home test.  Pt instructed to drink warm liquids and to use throat lozenges for the sore throat.  Pt also instructed to take OTC cold and cough medicine to assist with his other symptoms. Steamy showers and humidifiers are also helpful to break up mucous.  RN encouraged pt to make sure that he staggers his cold medication and Oxy (4hrs apart) so that there are no unwarranted reactions.  Pt also made aware that he can alternate with Tylenol and ibuprofen for pain when needed and only take the Oxy for severe pain.  Pt instructed to call if symptoms worsen or to report to the nearest ED or urgent care for eval.  Pt verbalized understanding to all.

## 2023-03-09 ENCOUNTER — OFFICE VISIT (OUTPATIENT)
Dept: SURGERY | Facility: CLINIC | Age: 63
End: 2023-03-09
Payer: COMMERCIAL

## 2023-03-09 VITALS
WEIGHT: 192.69 LBS | DIASTOLIC BLOOD PRESSURE: 71 MMHG | HEART RATE: 77 BPM | HEIGHT: 67 IN | BODY MASS INDEX: 30.24 KG/M2 | SYSTOLIC BLOOD PRESSURE: 133 MMHG | RESPIRATION RATE: 19 BRPM | OXYGEN SATURATION: 95 %

## 2023-03-09 DIAGNOSIS — Z09 POSTOP CHECK: ICD-10-CM

## 2023-03-09 DIAGNOSIS — K60.3 PERIANAL FISTULA: Primary | ICD-10-CM

## 2023-03-09 PROCEDURE — 1160F RVW MEDS BY RX/DR IN RCRD: CPT | Mod: CPTII,S$GLB,, | Performed by: COLON & RECTAL SURGERY

## 2023-03-09 PROCEDURE — 1159F MED LIST DOCD IN RCRD: CPT | Mod: CPTII,S$GLB,, | Performed by: COLON & RECTAL SURGERY

## 2023-03-09 PROCEDURE — 3078F PR MOST RECENT DIASTOLIC BLOOD PRESSURE < 80 MM HG: ICD-10-PCS | Mod: CPTII,S$GLB,, | Performed by: COLON & RECTAL SURGERY

## 2023-03-09 PROCEDURE — 3075F PR MOST RECENT SYSTOLIC BLOOD PRESS GE 130-139MM HG: ICD-10-PCS | Mod: CPTII,S$GLB,, | Performed by: COLON & RECTAL SURGERY

## 2023-03-09 PROCEDURE — 99999 PR PBB SHADOW E&M-EST. PATIENT-LVL IV: CPT | Mod: PBBFAC,,, | Performed by: COLON & RECTAL SURGERY

## 2023-03-09 PROCEDURE — 3008F PR BODY MASS INDEX (BMI) DOCUMENTED: ICD-10-PCS | Mod: CPTII,S$GLB,, | Performed by: COLON & RECTAL SURGERY

## 2023-03-09 PROCEDURE — 99999 PR PBB SHADOW E&M-EST. PATIENT-LVL IV: ICD-10-PCS | Mod: PBBFAC,,, | Performed by: COLON & RECTAL SURGERY

## 2023-03-09 PROCEDURE — 3078F DIAST BP <80 MM HG: CPT | Mod: CPTII,S$GLB,, | Performed by: COLON & RECTAL SURGERY

## 2023-03-09 PROCEDURE — 3075F SYST BP GE 130 - 139MM HG: CPT | Mod: CPTII,S$GLB,, | Performed by: COLON & RECTAL SURGERY

## 2023-03-09 PROCEDURE — 4010F ACE/ARB THERAPY RXD/TAKEN: CPT | Mod: CPTII,S$GLB,, | Performed by: COLON & RECTAL SURGERY

## 2023-03-09 PROCEDURE — 99024 POSTOP FOLLOW-UP VISIT: CPT | Mod: S$GLB,,, | Performed by: COLON & RECTAL SURGERY

## 2023-03-09 PROCEDURE — 4010F PR ACE/ARB THEARPY RXD/TAKEN: ICD-10-PCS | Mod: CPTII,S$GLB,, | Performed by: COLON & RECTAL SURGERY

## 2023-03-09 PROCEDURE — 1159F PR MEDICATION LIST DOCUMENTED IN MEDICAL RECORD: ICD-10-PCS | Mod: CPTII,S$GLB,, | Performed by: COLON & RECTAL SURGERY

## 2023-03-09 PROCEDURE — 1160F PR REVIEW ALL MEDS BY PRESCRIBER/CLIN PHARMACIST DOCUMENTED: ICD-10-PCS | Mod: CPTII,S$GLB,, | Performed by: COLON & RECTAL SURGERY

## 2023-03-09 PROCEDURE — 3008F BODY MASS INDEX DOCD: CPT | Mod: CPTII,S$GLB,, | Performed by: COLON & RECTAL SURGERY

## 2023-03-09 PROCEDURE — 99024 PR POST-OP FOLLOW-UP VISIT: ICD-10-PCS | Mod: S$GLB,,, | Performed by: COLON & RECTAL SURGERY

## 2023-03-09 NOTE — PROGRESS NOTES
"  CRS Office Visit Follow-up  Referring Md:   No referring provider defined for this encounter.    SUBJECTIVE:     Chief Complaint: Anorectal fistula    History of Present Illness:  Patient is a 62 y.o. male presents with Anorectal fistula. The patient is a established patient to this practice.  He underwent fistula closure with [facility] on 12/21/    Prior anorectal surgical history:  [facility] (Dr. David Wolf)   - 2/14/22 EUA + I&D abscess: 9x7cm perianal abscess extending into left buttock, 2 vessels loops placed via counterincisions for drainage   - 4/21/22 EUA + seton placement: Perianal fistula with external opening at 2:00, internal opening posterior midline at dentate. Draining silk seton placed.   - 5/25/22 EUA + hemorrhoidectomy + rectal mucosal advancement flap: 3 column internal hemorrhoidectomy with Ligasure, rectal mucosal advancement flap used to cover the posterior midline fistula, fistula tract packed with 1/2" iodoform gauze     [facility] (Madhu)   - 6/29/22 EUA + fistulotomy + seton placement: left anterolateral external opening which tracked posterior eventually to the midline above the dentate line, posterior midline intersphincteric fistula.     -2/16/23 EUA + fistulectomy + seton placement: High transsphincteric fistula, with internal opening posterior midline    1/19/23 Follow up:  Drainage is decreasing, but still having to change pad 6-8 times a day due to drainage. He feels like there is something hard near his rectum. After bowel movements, experiences major pain that persists for long periods of time. He sometimes needs to sit in warm bath tub to help the pain subside.     2/2/23 Follow up: Pain improved - overall but not resolved.  Drainage has decreased some. He does feel overall better than prior to surgery.     2/26/23 Follow up: Still experiencing drainage and pain after bowel movements that persists. The drainage he is experiencing consists of blood, pus, and " "stool.    3/9/23 Follow up: Pain improved. Drainage improved.     Review of Systems:  Review of Systems   Constitutional:  Negative for chills and fever.     OBJECTIVE:     Vital Signs (Most Recent)/71 (BP Location: Left arm, Patient Position: Sitting)   Pulse 77   Resp 19   Ht 5' 7" (1.702 m)   Wt 87.4 kg (192 lb 10.9 oz)   SpO2 95%   BMI 30.18 kg/m²       Physical Exam:  /71 (BP Location: Left arm, Patient Position: Sitting)   Pulse 77   Resp 19   Ht 5' 7" (1.702 m)   Wt 87.4 kg (192 lb 10.9 oz)   SpO2 95%   BMI 30.18 kg/m²   Well appearing.   Wound granulating.  Seton in place and tightened today after local infiltration with lidocain.         ASSESSMENT/PLAN:     Diagnoses and all orders for this visit:    Perianal fistula    Postop check    F/u 3 weeks.     Activity and diet ad angel.                      "

## 2023-03-30 ENCOUNTER — PATIENT MESSAGE (OUTPATIENT)
Dept: SURGERY | Facility: CLINIC | Age: 63
End: 2023-03-30

## 2023-03-30 ENCOUNTER — OFFICE VISIT (OUTPATIENT)
Dept: SURGERY | Facility: CLINIC | Age: 63
End: 2023-03-30
Payer: COMMERCIAL

## 2023-03-30 VITALS
DIASTOLIC BLOOD PRESSURE: 85 MMHG | HEART RATE: 73 BPM | SYSTOLIC BLOOD PRESSURE: 147 MMHG | BODY MASS INDEX: 30.27 KG/M2 | WEIGHT: 192.88 LBS | HEIGHT: 67 IN

## 2023-03-30 DIAGNOSIS — K60.3 PERIANAL FISTULA: Primary | ICD-10-CM

## 2023-03-30 PROCEDURE — 1159F MED LIST DOCD IN RCRD: CPT | Mod: CPTII,S$GLB,, | Performed by: COLON & RECTAL SURGERY

## 2023-03-30 PROCEDURE — 3079F DIAST BP 80-89 MM HG: CPT | Mod: CPTII,S$GLB,, | Performed by: COLON & RECTAL SURGERY

## 2023-03-30 PROCEDURE — 1159F PR MEDICATION LIST DOCUMENTED IN MEDICAL RECORD: ICD-10-PCS | Mod: CPTII,S$GLB,, | Performed by: COLON & RECTAL SURGERY

## 2023-03-30 PROCEDURE — 99024 PR POST-OP FOLLOW-UP VISIT: ICD-10-PCS | Mod: S$GLB,,, | Performed by: COLON & RECTAL SURGERY

## 2023-03-30 PROCEDURE — 4010F ACE/ARB THERAPY RXD/TAKEN: CPT | Mod: CPTII,S$GLB,, | Performed by: COLON & RECTAL SURGERY

## 2023-03-30 PROCEDURE — 4010F PR ACE/ARB THEARPY RXD/TAKEN: ICD-10-PCS | Mod: CPTII,S$GLB,, | Performed by: COLON & RECTAL SURGERY

## 2023-03-30 PROCEDURE — 3008F BODY MASS INDEX DOCD: CPT | Mod: CPTII,S$GLB,, | Performed by: COLON & RECTAL SURGERY

## 2023-03-30 PROCEDURE — 99999 PR PBB SHADOW E&M-EST. PATIENT-LVL III: CPT | Mod: PBBFAC,,, | Performed by: COLON & RECTAL SURGERY

## 2023-03-30 PROCEDURE — 99024 POSTOP FOLLOW-UP VISIT: CPT | Mod: S$GLB,,, | Performed by: COLON & RECTAL SURGERY

## 2023-03-30 PROCEDURE — 3008F PR BODY MASS INDEX (BMI) DOCUMENTED: ICD-10-PCS | Mod: CPTII,S$GLB,, | Performed by: COLON & RECTAL SURGERY

## 2023-03-30 PROCEDURE — 3079F PR MOST RECENT DIASTOLIC BLOOD PRESSURE 80-89 MM HG: ICD-10-PCS | Mod: CPTII,S$GLB,, | Performed by: COLON & RECTAL SURGERY

## 2023-03-30 PROCEDURE — 3077F SYST BP >= 140 MM HG: CPT | Mod: CPTII,S$GLB,, | Performed by: COLON & RECTAL SURGERY

## 2023-03-30 PROCEDURE — 99999 PR PBB SHADOW E&M-EST. PATIENT-LVL III: ICD-10-PCS | Mod: PBBFAC,,, | Performed by: COLON & RECTAL SURGERY

## 2023-03-30 PROCEDURE — 3077F PR MOST RECENT SYSTOLIC BLOOD PRESSURE >= 140 MM HG: ICD-10-PCS | Mod: CPTII,S$GLB,, | Performed by: COLON & RECTAL SURGERY

## 2023-03-30 RX ORDER — OXYCODONE AND ACETAMINOPHEN 10; 325 MG/1; MG/1
1 TABLET ORAL EVERY 6 HOURS PRN
Qty: 24 TABLET | Refills: 0 | Status: ON HOLD | OUTPATIENT
Start: 2023-03-30 | End: 2023-07-11 | Stop reason: HOSPADM

## 2023-03-30 RX ORDER — NALOXONE HYDROCHLORIDE 4 MG/.1ML
SPRAY NASAL
Qty: 1 EACH | Refills: 11 | Status: SHIPPED | OUTPATIENT
Start: 2023-03-30 | End: 2023-08-17

## 2023-03-30 NOTE — PROGRESS NOTES
"  CRS Office Visit Follow-up  Referring Md:   No referring provider defined for this encounter.    SUBJECTIVE:     Chief Complaint: Anorectal fistula    History of Present Illness:  Patient is a 62 y.o. male presents with Anorectal fistula. The patient is a established patient to this practice.  He underwent fistula closure with [facility] on 12/21/    Prior anorectal surgical history:  [facility] (Dr. David Wolf)   - 2/14/22 EUA + I&D abscess: 9x7cm perianal abscess extending into left buttock, 2 vessels loops placed via counterincisions for drainage   - 4/21/22 EUA + seton placement: Perianal fistula with external opening at 2:00, internal opening posterior midline at dentate. Draining silk seton placed.   - 5/25/22 EUA + hemorrhoidectomy + rectal mucosal advancement flap: 3 column internal hemorrhoidectomy with Ligasure, rectal mucosal advancement flap used to cover the posterior midline fistula, fistula tract packed with 1/2" iodoform gauze     [facility] (Madhu)   - 6/29/22 EUA + fistulotomy + seton placement: left anterolateral external opening which tracked posterior eventually to the midline above the dentate line, posterior midline intersphincteric fistula.     -2/16/23 EUA + fistulectomy + seton placement: High transsphincteric fistula, with internal opening posterior midline    1/19/23 Follow up:  Drainage is decreasing, but still having to change pad 6-8 times a day due to drainage. He feels like there is something hard near his rectum. After bowel movements, experiences major pain that persists for long periods of time. He sometimes needs to sit in warm bath tub to help the pain subside.     2/2/23 Follow up: Pain improved - overall but not resolved.  Drainage has decreased some. He does feel overall better than prior to surgery.     2/26/23 Follow up: Still experiencing drainage and pain after bowel movements that persists. The drainage he is experiencing consists of blood, pus, and " "stool.    3/9/23 Follow up: Pain improved. Drainage improved.     3/30/23 Follow up: still some drainage after bowel movements, the drain is still in. Pain level is at a 1.    Review of Systems:  Review of Systems   Constitutional:  Negative for chills and fever.     OBJECTIVE:     Vital Signs (Most Recent)BP (!) 147/85 (BP Location: Left arm, Patient Position: Sitting, BP Method: Large (Automatic))   Pulse 73   Ht 5' 7" (1.702 m)   Wt 87.5 kg (192 lb 14.4 oz)   BMI 30.21 kg/m²       Physical Exam:  BP (!) 147/85 (BP Location: Left arm, Patient Position: Sitting, BP Method: Large (Automatic))   Pulse 73   Ht 5' 7" (1.702 m)   Wt 87.5 kg (192 lb 14.4 oz)   BMI 30.21 kg/m²   Well appearing.   Wound granulating.  Seton in place and tightened today after local infiltration with lidocain.         ASSESSMENT/PLAN:     Jimmy was seen today for post-op evaluation.    Diagnoses and all orders for this visit:    Perianal fistula  -     oxyCODONE-acetaminophen (PERCOCET)  mg per tablet; Take 1 tablet by mouth every 6 (six) hours as needed for Pain.    Other orders  -     naloxone (NARCAN) 4 mg/actuation Spry; 4mg by nasal route as needed for opioid overdose; may repeat every 2-3 minutes in alternating nostrils until medical help arrives. Call 911      Activity and diet ad angel.                        "

## 2023-04-03 ENCOUNTER — PATIENT MESSAGE (OUTPATIENT)
Dept: SURGERY | Facility: CLINIC | Age: 63
End: 2023-04-03
Payer: COMMERCIAL

## 2023-04-12 ENCOUNTER — PATIENT MESSAGE (OUTPATIENT)
Dept: SURGERY | Facility: CLINIC | Age: 63
End: 2023-04-12
Payer: COMMERCIAL

## 2023-04-18 NOTE — PROGRESS NOTES
"  CRS Office Visit Follow-up  Referring Md:   No referring provider defined for this encounter.    SUBJECTIVE:     Chief Complaint: Anorectal fistula    History of Present Illness:  Patient is a 62 y.o. male presents with Anorectal fistula. The patient is a established patient to this practice.  He underwent fistula closure with Kerecis SurgiClose on 12/21/    Prior anorectal surgical history:  [facility] (Dr. David Wolf)   - 2/14/22 EUA + I&D abscess: 9x7cm perianal abscess extending into left buttock, 2 vessels loops placed via counterincisions for drainage   - 4/21/22 EUA + seton placement: Perianal fistula with external opening at 2:00, internal opening posterior midline at dentate. Draining silk seton placed.   - 5/25/22 EUA + hemorrhoidectomy + rectal mucosal advancement flap: 3 column internal hemorrhoidectomy with Ligasure, rectal mucosal advancement flap used to cover the posterior midline fistula, fistula tract packed with 1/2" iodoform gauze     [facility] (Madhu)   - 6/29/22 EUA + fistulotomy + seton placement: left anterolateral external opening which tracked posterior eventually to the midline above the dentate line, posterior midline intersphincteric fistula.     -2/16/23 EUA + fistulectomy + seton placement: High transsphincteric fistula, with internal opening posterior midline    1/19/23 Follow up:  Drainage is decreasing, but still having to change pad 6-8 times a day due to drainage. He feels like there is something hard near his rectum. After bowel movements, experiences major pain that persists for long periods of time. He sometimes needs to sit in warm bath tub to help the pain subside.     2/2/23 Follow up: Pain improved - overall but not resolved.  Drainage has decreased some. He does feel overall better than prior to surgery.     2/26/23 Follow up: Still experiencing drainage and pain after bowel movements that persists. The drainage he is experiencing consists of blood, pus, and " "stool.    3/9/23 Follow up: Pain improved. Drainage improved.     3/30/23 Follow up: still some drainage after bowel movements, the drain is still in. Pain level is at a 1.    4/20/23 Follow up: Pain has resolved, only experienced minor pain for about 4-5 days after last procedure. Only experiences minor drainage after a bowel movement and has stopped taking miralax about 2 weeks ago. Seton still in place.    Review of Systems:  Review of Systems   Constitutional:  Negative for chills and fever.     OBJECTIVE:     Vital Signs (Most Recent)/72 (BP Location: Right arm, Patient Position: Sitting, BP Method: Large (Automatic))   Pulse 70   Ht 5' 7" (1.702 m)   Wt 88.6 kg (195 lb 4.8 oz)   BMI 30.59 kg/m²       Physical Exam:  /72 (BP Location: Right arm, Patient Position: Sitting, BP Method: Large (Automatic))   Pulse 70   Ht 5' 7" (1.702 m)   Wt 88.6 kg (195 lb 4.8 oz)   BMI 30.59 kg/m²   Well appearing.   Wound granulating.  Seton in place . Silver nitrate applied to granulation tissue.     ASSESSMENT/PLAN:     Jimmy was seen today for anal fistula.    Diagnoses and all orders for this visit:    Perianal fistula        Schedule EUA for July 11, 2023 and follow up appointment in late june                         "

## 2023-04-20 ENCOUNTER — OFFICE VISIT (OUTPATIENT)
Dept: SURGERY | Facility: CLINIC | Age: 63
End: 2023-04-20
Payer: COMMERCIAL

## 2023-04-20 VITALS
HEART RATE: 70 BPM | SYSTOLIC BLOOD PRESSURE: 132 MMHG | DIASTOLIC BLOOD PRESSURE: 72 MMHG | HEIGHT: 67 IN | WEIGHT: 195.31 LBS | BODY MASS INDEX: 30.65 KG/M2

## 2023-04-20 DIAGNOSIS — K60.3 PERIANAL FISTULA: Primary | ICD-10-CM

## 2023-04-20 PROCEDURE — 3008F BODY MASS INDEX DOCD: CPT | Mod: CPTII,S$GLB,, | Performed by: COLON & RECTAL SURGERY

## 2023-04-20 PROCEDURE — 3075F SYST BP GE 130 - 139MM HG: CPT | Mod: CPTII,S$GLB,, | Performed by: COLON & RECTAL SURGERY

## 2023-04-20 PROCEDURE — 3075F PR MOST RECENT SYSTOLIC BLOOD PRESS GE 130-139MM HG: ICD-10-PCS | Mod: CPTII,S$GLB,, | Performed by: COLON & RECTAL SURGERY

## 2023-04-20 PROCEDURE — 99213 OFFICE O/P EST LOW 20 MIN: CPT | Mod: S$GLB,,, | Performed by: COLON & RECTAL SURGERY

## 2023-04-20 PROCEDURE — 99999 PR PBB SHADOW E&M-EST. PATIENT-LVL IV: ICD-10-PCS | Mod: PBBFAC,,, | Performed by: COLON & RECTAL SURGERY

## 2023-04-20 PROCEDURE — 99999 PR PBB SHADOW E&M-EST. PATIENT-LVL IV: CPT | Mod: PBBFAC,,, | Performed by: COLON & RECTAL SURGERY

## 2023-04-20 PROCEDURE — 3008F PR BODY MASS INDEX (BMI) DOCUMENTED: ICD-10-PCS | Mod: CPTII,S$GLB,, | Performed by: COLON & RECTAL SURGERY

## 2023-04-20 PROCEDURE — 99213 PR OFFICE/OUTPT VISIT, EST, LEVL III, 20-29 MIN: ICD-10-PCS | Mod: S$GLB,,, | Performed by: COLON & RECTAL SURGERY

## 2023-04-20 PROCEDURE — 4010F ACE/ARB THERAPY RXD/TAKEN: CPT | Mod: CPTII,S$GLB,, | Performed by: COLON & RECTAL SURGERY

## 2023-04-20 PROCEDURE — 4010F PR ACE/ARB THEARPY RXD/TAKEN: ICD-10-PCS | Mod: CPTII,S$GLB,, | Performed by: COLON & RECTAL SURGERY

## 2023-04-20 PROCEDURE — 3078F PR MOST RECENT DIASTOLIC BLOOD PRESSURE < 80 MM HG: ICD-10-PCS | Mod: CPTII,S$GLB,, | Performed by: COLON & RECTAL SURGERY

## 2023-04-20 PROCEDURE — 1159F MED LIST DOCD IN RCRD: CPT | Mod: CPTII,S$GLB,, | Performed by: COLON & RECTAL SURGERY

## 2023-04-20 PROCEDURE — 3078F DIAST BP <80 MM HG: CPT | Mod: CPTII,S$GLB,, | Performed by: COLON & RECTAL SURGERY

## 2023-04-20 PROCEDURE — 1159F PR MEDICATION LIST DOCUMENTED IN MEDICAL RECORD: ICD-10-PCS | Mod: CPTII,S$GLB,, | Performed by: COLON & RECTAL SURGERY

## 2023-04-20 RX ORDER — LIDOCAINE HYDROCHLORIDE 10 MG/ML
1 INJECTION, SOLUTION EPIDURAL; INFILTRATION; INTRACAUDAL; PERINEURAL ONCE
Status: CANCELLED | OUTPATIENT
Start: 2023-04-20 | End: 2023-04-20

## 2023-04-20 NOTE — LETTER
April 20, 2023      [facility]  [address] [place] [id number]  Phone: [phone]  Fax: [phone]       Patient: Jimmy Shaikh   YOB: 1960  Date of Visit: 04/20/2023    To Whom It May Concern:    Ventura Shaikh  was at [facility] on 04/20/2023. The patient may return to work on 4/24/23 with no restrictions. If you have any questions or concerns, or if I can be of further assistance, please do not hesitate to contact me.    Sincerely,    Dr. Esteban Leung MD

## 2023-06-20 NOTE — H&P (VIEW-ONLY)
"  CRS Office Visit Follow-up  Referring Md:   No referring provider defined for this encounter.    SUBJECTIVE:     Chief Complaint: Anorectal fistula    History of Present Illness:  Patient is a 62 y.o. male presents with Anorectal fistula. The patient is a established patient to this practice.  He underwent fistula closure with Kerecis SurgiClose on 12/21/    Prior anorectal surgical history:  [facility] (Dr. David Wolf)   - 2/14/22 EUA + I&D abscess: 9x7cm perianal abscess extending into left buttock, 2 vessels loops placed via counterincisions for drainage   - 4/21/22 EUA + seton placement: Perianal fistula with external opening at 2:00, internal opening posterior midline at dentate. Draining silk seton placed.   - 5/25/22 EUA + hemorrhoidectomy + rectal mucosal advancement flap: 3 column internal hemorrhoidectomy with Ligasure, rectal mucosal advancement flap used to cover the posterior midline fistula, fistula tract packed with 1/2" iodoform gauze     [facility] (Madhu)   - 6/29/22 EUA + fistulotomy + seton placement: left anterolateral external opening which tracked posterior eventually to the midline above the dentate line, posterior midline intersphincteric fistula.     -2/16/23 EUA + fistulectomy + seton placement: High transsphincteric fistula, with internal opening posterior midline    1/19/23 Follow up:  Drainage is decreasing, but still having to change pad 6-8 times a day due to drainage. He feels like there is something hard near his rectum. After bowel movements, experiences major pain that persists for long periods of time. He sometimes needs to sit in warm bath tub to help the pain subside.     2/2/23 Follow up: Pain improved - overall but not resolved.  Drainage has decreased some. He does feel overall better than prior to surgery.     2/26/23 Follow up: Still experiencing drainage and pain after bowel movements that persists. The drainage he is experiencing consists of blood, pus, and " "stool.    3/9/23 Follow up: Pain improved. Drainage improved.     3/30/23 Follow up: still some drainage after bowel movements, the drain is still in. Pain level is at a 1.    4/20/23 Follow up: Pain has resolved, only experienced minor pain for about 4-5 days after last procedure. Only experiences minor drainage after a bowel movement and has stopped taking miralax about 2 weeks ago. Seton still in place.    6/20/23 Follow up: Patient here today to sign consents for fistulotomy on 7/11/23. He hasn't been having any pain, just minimal drainage after BM and during the night. Seton still in place. Discussed surgery today.    Review of Systems:  Review of Systems   Constitutional:  Negative for chills and fever.     OBJECTIVE:     Vital Signs (Most Recent)/68 (BP Location: Left arm, Patient Position: Sitting, BP Method: Large (Automatic))   Pulse 63   Ht 5' 7" (1.702 m)   Wt 89.9 kg (198 lb 3.1 oz)   BMI 31.04 kg/m²       Physical Exam:  /68 (BP Location: Left arm, Patient Position: Sitting, BP Method: Large (Automatic))   Pulse 63   Ht 5' 7" (1.702 m)   Wt 89.9 kg (198 lb 3.1 oz)   BMI 31.04 kg/m²   Well appearing.   Wound granulating.  Seton in place . Silver nitrate applied to granulation tissue.     ASSESSMENT/PLAN:     Jimmy was seen today for pre-op exam.    Diagnoses and all orders for this visit:    Perianal fistula          Schedule EUA for July 11, 2023 and follow up appointment in late June, explained procedure today and signed consents. Discussed post-op instructions                           "

## 2023-06-20 NOTE — PROGRESS NOTES
"  CRS Office Visit Follow-up  Referring Md:   No referring provider defined for this encounter.    SUBJECTIVE:     Chief Complaint: Anorectal fistula    History of Present Illness:  Patient is a 62 y.o. male presents with Anorectal fistula. The patient is a established patient to this practice.  He underwent fistula closure with Kerecis SurgiClose on 12/21/    Prior anorectal surgical history:  [facility] (Dr. David Wolf)   - 2/14/22 EUA + I&D abscess: 9x7cm perianal abscess extending into left buttock, 2 vessels loops placed via counterincisions for drainage   - 4/21/22 EUA + seton placement: Perianal fistula with external opening at 2:00, internal opening posterior midline at dentate. Draining silk seton placed.   - 5/25/22 EUA + hemorrhoidectomy + rectal mucosal advancement flap: 3 column internal hemorrhoidectomy with Ligasure, rectal mucosal advancement flap used to cover the posterior midline fistula, fistula tract packed with 1/2" iodoform gauze     [facility] (Madhu)   - 6/29/22 EUA + fistulotomy + seton placement: left anterolateral external opening which tracked posterior eventually to the midline above the dentate line, posterior midline intersphincteric fistula.     -2/16/23 EUA + fistulectomy + seton placement: High transsphincteric fistula, with internal opening posterior midline    1/19/23 Follow up:  Drainage is decreasing, but still having to change pad 6-8 times a day due to drainage. He feels like there is something hard near his rectum. After bowel movements, experiences major pain that persists for long periods of time. He sometimes needs to sit in warm bath tub to help the pain subside.     2/2/23 Follow up: Pain improved - overall but not resolved.  Drainage has decreased some. He does feel overall better than prior to surgery.     2/26/23 Follow up: Still experiencing drainage and pain after bowel movements that persists. The drainage he is experiencing consists of blood, pus, and " "stool.    3/9/23 Follow up: Pain improved. Drainage improved.     3/30/23 Follow up: still some drainage after bowel movements, the drain is still in. Pain level is at a 1.    4/20/23 Follow up: Pain has resolved, only experienced minor pain for about 4-5 days after last procedure. Only experiences minor drainage after a bowel movement and has stopped taking miralax about 2 weeks ago. Seton still in place.    6/20/23 Follow up: Patient here today to sign consents for fistulotomy on 7/11/23. He hasn't been having any pain, just minimal drainage after BM and during the night. Seton still in place. Discussed surgery today.    Review of Systems:  Review of Systems   Constitutional:  Negative for chills and fever.     OBJECTIVE:     Vital Signs (Most Recent)/68 (BP Location: Left arm, Patient Position: Sitting, BP Method: Large (Automatic))   Pulse 63   Ht 5' 7" (1.702 m)   Wt 89.9 kg (198 lb 3.1 oz)   BMI 31.04 kg/m²       Physical Exam:  /68 (BP Location: Left arm, Patient Position: Sitting, BP Method: Large (Automatic))   Pulse 63   Ht 5' 7" (1.702 m)   Wt 89.9 kg (198 lb 3.1 oz)   BMI 31.04 kg/m²   Well appearing.   Wound granulating.  Seton in place . Silver nitrate applied to granulation tissue.     ASSESSMENT/PLAN:     Jimmy was seen today for pre-op exam.    Diagnoses and all orders for this visit:    Perianal fistula          Schedule EUA for July 11, 2023 and follow up appointment in late June, explained procedure today and signed consents. Discussed post-op instructions                           "

## 2023-06-22 ENCOUNTER — OFFICE VISIT (OUTPATIENT)
Dept: SURGERY | Facility: CLINIC | Age: 63
End: 2023-06-22
Payer: COMMERCIAL

## 2023-06-22 VITALS
WEIGHT: 198.19 LBS | HEART RATE: 63 BPM | BODY MASS INDEX: 31.11 KG/M2 | DIASTOLIC BLOOD PRESSURE: 68 MMHG | HEIGHT: 67 IN | SYSTOLIC BLOOD PRESSURE: 115 MMHG

## 2023-06-22 DIAGNOSIS — K60.3 PERIANAL FISTULA: Primary | ICD-10-CM

## 2023-06-22 PROCEDURE — 3078F DIAST BP <80 MM HG: CPT | Mod: CPTII,S$GLB,, | Performed by: COLON & RECTAL SURGERY

## 2023-06-22 PROCEDURE — 99213 OFFICE O/P EST LOW 20 MIN: CPT | Mod: S$GLB,,, | Performed by: COLON & RECTAL SURGERY

## 2023-06-22 PROCEDURE — 3074F PR MOST RECENT SYSTOLIC BLOOD PRESSURE < 130 MM HG: ICD-10-PCS | Mod: CPTII,S$GLB,, | Performed by: COLON & RECTAL SURGERY

## 2023-06-22 PROCEDURE — 1160F PR REVIEW ALL MEDS BY PRESCRIBER/CLIN PHARMACIST DOCUMENTED: ICD-10-PCS | Mod: CPTII,S$GLB,, | Performed by: COLON & RECTAL SURGERY

## 2023-06-22 PROCEDURE — 99999 PR PBB SHADOW E&M-EST. PATIENT-LVL III: ICD-10-PCS | Mod: PBBFAC,,, | Performed by: COLON & RECTAL SURGERY

## 2023-06-22 PROCEDURE — 1160F RVW MEDS BY RX/DR IN RCRD: CPT | Mod: CPTII,S$GLB,, | Performed by: COLON & RECTAL SURGERY

## 2023-06-22 PROCEDURE — 1159F MED LIST DOCD IN RCRD: CPT | Mod: CPTII,S$GLB,, | Performed by: COLON & RECTAL SURGERY

## 2023-06-22 PROCEDURE — 3074F SYST BP LT 130 MM HG: CPT | Mod: CPTII,S$GLB,, | Performed by: COLON & RECTAL SURGERY

## 2023-06-22 PROCEDURE — 3008F PR BODY MASS INDEX (BMI) DOCUMENTED: ICD-10-PCS | Mod: CPTII,S$GLB,, | Performed by: COLON & RECTAL SURGERY

## 2023-06-22 PROCEDURE — 4010F PR ACE/ARB THEARPY RXD/TAKEN: ICD-10-PCS | Mod: CPTII,S$GLB,, | Performed by: COLON & RECTAL SURGERY

## 2023-06-22 PROCEDURE — 99213 PR OFFICE/OUTPT VISIT, EST, LEVL III, 20-29 MIN: ICD-10-PCS | Mod: S$GLB,,, | Performed by: COLON & RECTAL SURGERY

## 2023-06-22 PROCEDURE — 4010F ACE/ARB THERAPY RXD/TAKEN: CPT | Mod: CPTII,S$GLB,, | Performed by: COLON & RECTAL SURGERY

## 2023-06-22 PROCEDURE — 3008F BODY MASS INDEX DOCD: CPT | Mod: CPTII,S$GLB,, | Performed by: COLON & RECTAL SURGERY

## 2023-06-22 PROCEDURE — 1159F PR MEDICATION LIST DOCUMENTED IN MEDICAL RECORD: ICD-10-PCS | Mod: CPTII,S$GLB,, | Performed by: COLON & RECTAL SURGERY

## 2023-06-22 PROCEDURE — 99999 PR PBB SHADOW E&M-EST. PATIENT-LVL III: CPT | Mod: PBBFAC,,, | Performed by: COLON & RECTAL SURGERY

## 2023-06-22 PROCEDURE — 3078F PR MOST RECENT DIASTOLIC BLOOD PRESSURE < 80 MM HG: ICD-10-PCS | Mod: CPTII,S$GLB,, | Performed by: COLON & RECTAL SURGERY

## 2023-06-29 ENCOUNTER — TELEPHONE (OUTPATIENT)
Dept: PREADMISSION TESTING | Facility: HOSPITAL | Age: 63
End: 2023-06-29
Payer: COMMERCIAL

## 2023-06-29 DIAGNOSIS — Z01.818 PRE-OP TESTING: Primary | ICD-10-CM

## 2023-06-29 NOTE — ANESTHESIA PAT ROS NOTE
06/29/2023  Jimmy Shaikh is a 62 y.o., male.      Pre-op Assessment          Review of Systems  Anesthesia Hx:  No problems with previous Anesthesia   History of prior surgery of interest to airway management or planning:  Previous anesthesia: General Exam under anesthesia 2/17/23 with general anesthesia.  Procedure performed at an [facility].      Airway issues documented on chart review include mask, easy, easy direct laryngoscopy     Denies Family Hx of Anesthesia complications.    Denies Personal Hx of Anesthesia complications.                    Social:  Alcohol Use, Former Smoker       Hematology/Oncology:  Hematology Normal   Oncology Normal                                   EENT/Dental:  EENT/Dental Normal           Cardiovascular:     Hypertension       Denies Angina.     hyperlipidemia     Functional Capacity good / => 4 METS                         Pulmonary:       Denies Shortness of breath.  Denies Recent URI.                 Renal/:  Renal/ Normal                 Hepatic/GI:     GERD   SCHATZKI'S RING. PERIANAL FISTULA          Musculoskeletal:     HX OF NECK FRACTURE.            Neurological:  Neurology Normal                                      Endocrine:        Obesity / BMI > 30  Psych:  Psychiatric Normal                       Anesthesia Assessment: Preoperative EQUATION    Planned Procedure: Procedure(s) (LRB):  FISTULOTOMY, RECTUM (N/A)  Requested Anesthesia Type:Choice  Surgeon: Esteban Leung MD  Service: Colon and Rectal  Known or anticipated Date of Surgery:7/11/2023    Surgeon notes: reviewed    Electronic QUestionnaire Assessment completed via nurse interview with patient.        Triage considerations:     The patient has no apparent active cardiac condition (No unstable coronary Syndrome such as severe unstable angina or recent [<1 month] myocardial  infarction, decompensated CHF, severe valvular   disease or significant arrhythmia)    Previous anesthesia records:GETA and No problems    Airway:  Mallampati: III / III  Mouth Opening: Normal  TM Distance: Normal  Tongue: Normal  Neck ROM: Normal ROM    Placement Date: 02/17/23 Placement Time: 1209 , created via procedure documentation  Method of Intubation: Direct laryngoscopy Mask Ventilation: Easy Intubated: Postinduction Blade: Amos #3 Airway Device Size: 7.5 Placement Verified By: Capnometry Complicating Factors: None Findings Post-Intubation: Bilateral breath sounds;Atraumatic/Condition of teeth unchanged Complications: None Removal Date: 02/17/23    Last PCP note: outside [facility]   Subspecialty notes:  CRS    Other important co-morbidities: GERD, HLD, HTN, and Obesity      Tests already available:  Available tests,  3-6 months ago , within [facility] .     2/15/23  MRI PELVIS            Instructions given. (See in Nurse's note)    Optimization:  Anesthesia Preop Clinic Assessment NOT Indicated    Medical Opinion NOT Indicated             Plan:    Testing:  BMP, EKG, and Hematology Profile        Patient  has previously scheduled Medical Appointment: NOT AT THIS TIME    Navigation: Tests Scheduled.                           Results will be tracked by Preop Clinic.

## 2023-07-10 ENCOUNTER — TELEPHONE (OUTPATIENT)
Dept: SURGERY | Facility: CLINIC | Age: 63
End: 2023-07-10
Payer: COMMERCIAL

## 2023-07-10 ENCOUNTER — ANESTHESIA EVENT (OUTPATIENT)
Dept: SURGERY | Facility: HOSPITAL | Age: 63
End: 2023-07-10
Payer: COMMERCIAL

## 2023-07-10 RX ORDER — ACETAMINOPHEN 500 MG
1000 TABLET ORAL
Status: CANCELLED | OUTPATIENT
Start: 2023-07-10 | End: 2023-07-11

## 2023-07-10 NOTE — ANESTHESIA PREPROCEDURE EVALUATION
[facility]  Anesthesia Pre-Operative Evaluation         Patient Name: Jimmy Shaikh  YOB: 1960  MRN: [id number]    SUBJECTIVE:     Pre-operative evaluation for Procedure(s) (LRB):  FISTULOTOMY, RECTUM (N/A)     07/10/2023    Jimmy Shaikh is a 62 y.o. male w/ a significant PMHx of HTN, GERD, HDL, perianal fistula s/p fistulectomy with seton stitch placement in Feb 2023.    Patient now presents for the above procedure(s).    No previous echo on file    Prev airway:   Induction:  Intravenous    Intubated:  Postinduction    Mask Ventilation:  Easy with oral airway    Attempts:  1    Attempted By:  Resident anesthesiologist    Method of Intubation:  Direct    Blade:  Amos 3    Laryngeal View Grade: Grade IIb - only the arytenoids and epiglottis seen      Difficult Airway Encountered?: No      Complications:  None    Airway Device:  Oral endotracheal tube    Airway Device Size:  7.5    Style/Cuff Inflation:  Cuffed (inflated to minimal occlusive pressure)    Inflation Amount (mL):  7    Tube secured:  23    Secured at:  The lips    Placement Verified By:  Capnometry    Complicating Factors:  None    Findings Post-Intubation:  BS equal bilateral and atraumatic/condition of teeth unchanged    Patient Active Problem List   Diagnosis    Dysphagia, pharyngoesophageal    Perirectal abscess    Perianal fistula       Review of patient's allergies indicates:  No Known Allergies    Current Inpatient Medications:      Current Facility-Administered Medications on File Prior to Encounter   Medication Dose Route Frequency Provider Last Rate Last Admin    0.9%  NaCl infusion   Intravenous Continuous Romario Forbes MD   Stopped at 12/15/21 1055    0.9%  NaCl infusion   Intravenous Continuous Kathi Gallagher NP        acetaminophen tablet 650 mg  650 mg Oral Once PRN Nils Mckinnon Jr., MD        lactated ringers infusion    Intravenous Continuous Willis Giron MD 0 mL/hr at 02/14/22 1130 New Bag at 05/25/22 1206    mupirocin 2 % ointment   Nasal On Call Procedure Kathi Gallagher NP   Given at 06/29/22 0645     Current Outpatient Medications on File Prior to Encounter   Medication Sig Dispense Refill    aspirin (ECOTRIN) 81 MG EC tablet Take 81 mg by mouth once daily.      atorvastatin (LIPITOR) 20 MG tablet Take 20 mg by mouth every evening.      esomeprazole (NEXIUM) 20 MG capsule Take 20 mg by mouth every evening.      lisinopril 10 MG tablet Take 10 mg by mouth every evening.      pantoprazole (PROTONIX) 40 MG tablet Take 40 mg by mouth every evening.      acetaminophen (TYLENOL) 500 MG tablet Take 2 tablets (1,000 mg total) by mouth every 8 (eight) hours. Please take every 8 hours scheduled for 1 week. Take as needed after 1 week if pain well controlled. (Patient not taking: Reported on 4/20/2023) 60 tablet 2    ibuprofen (ADVIL,MOTRIN) 800 MG tablet TAKE 1 TABLET BY MOUTH EVERY 8 HOURS FOR 1 WEEK, THEN TAKE AS NEEDED AFTER 1 WEEK IF PAIN WELL CONTROLLED (Patient not taking: Reported on 4/20/2023) 60 tablet 2    naloxone (NARCAN) 4 mg/actuation Spry 4mg by nasal route as needed for opioid overdose; may repeat every 2-3 minutes in alternating nostrils until medical help arrives. Call 911 (Patient not taking: Reported on 4/20/2023) 1 each 11    oxyCODONE (ROXICODONE) 5 MG immediate release tablet Take 1 tablet (5 mg total) by mouth every 6 (six) hours as needed for Pain. (Patient not taking: Reported on 4/20/2023) 20 tablet 0    oxyCODONE-acetaminophen (PERCOCET)  mg per tablet Take 1 tablet by mouth every 6 (six) hours as needed for Pain. (Patient not taking: Reported on 6/22/2023) 24 tablet 0       Past Surgical History:   Procedure Laterality Date    ANTERIOR CRUCIATE LIGAMENT REPAIR Left     BONE CYST EXCISION      neck    CLUB FOOT RELEASE Bilateral     COLONOSCOPY N/A 03/10/2017    Procedure:  COLONOSCOPY;  Surgeon: Romario Forbes MD;  Location: [facility] ENDO;  Service: Endoscopy;  Laterality: N/A;    COLONOSCOPY N/A 12/15/2021    Procedure: COLONOSCOPY;  Surgeon: Romario Forbes MD;  Location: [facility] ENDO;  Service: Endoscopy;  Laterality: N/A;    COSMETIC SURGERY      deviated septum    CYST REMOVAL      on neck    ESOPHAGOGASTRODUODENOSCOPY (EGD) WITH DILATION      EXAMINATION UNDER ANESTHESIA Left 4/21/2022    Procedure: Exam under anesthesia;  Surgeon: Casper Wolf MD;  Location: [facility] OR;  Service: General;  Laterality: Left;    EXAMINATION UNDER ANESTHESIA N/A 2/17/2023    Procedure: Exam under anesthesia;  Surgeon: Esteban Leung MD;  Location: [facility] OR 2ND FLR;  Service: Colon and Rectal;  Laterality: N/A;    EYE SURGERY      lasik    FACIAL RECONSTRUCTION Left     CHEEK BONE    FISTULECTOMY N/A 2/17/2023    Procedure: FISTULECTOMY;  Surgeon: Esteban Lenug MD;  Location: [facility] OR 2ND FLR;  Service: Colon and Rectal;  Laterality: N/A;    INCISION AND DRAINAGE OF PERIANAL FISTULA Left 4/21/2022    Procedure: INCISION AND DRAINAGE, FISTULA, PERIANAL;  Surgeon: Casper Wolf MD;  Location: [facility] OR;  Service: General;  Laterality: Left;    INCISION AND DRAINAGE OF PERIRECTAL REGION N/A 02/14/2022    Procedure: INCISION AND DRAINAGE, PERIRECTAL REGION;  Surgeon: Casper Wolf MD;  Location: [facility] OR;  Service: General;  Laterality: N/A;  8:30 per MD    INSERTION OF SETON STITCH N/A 6/29/2022    Procedure: PLACEMENT, SETON STITCH;  Surgeon: Esteban Leung MD;  Location: [facility] OR 2ND FLR;  Service: Colon and Rectal;  Laterality: N/A;    INSERTION OF SETON STITCH  2/17/2023    Procedure: PLACEMENT, SETON STITCH;  Surgeon: Esteban Leung MD;  Location: [facility] OR 2ND FLR;  Service: Colon and Rectal;;    MANDIBLE FRACTURE SURGERY      RECTAL FISTULOTOMY N/A 12/21/2022    Procedure: FISTULOTOMY, RECTUM;  Surgeon: Esteban Leung MD;  Location: [facility] OR 2ND FLR;   Service: Colon and Rectal;  Laterality: N/A;    REPAIR OF ANAL FISTULA N/A 2022    Procedure: CLOSURE, FISTULA, ANAL;  Surgeon: Casper Wolf MD;  [facility] OR;  Service: General;  Laterality: N/A;  10am per MD    REPLACEMENT-HOMOGRAFT N/A 2022    Procedure: REPLACEMENT-HOMOGRAFT;  Surgeon: Esteban Leung MD;  Location: [facility] OR 2ND FLR;  Service: Colon and Rectal;  Laterality: N/A;  76 centimeters    UPPER GASTROINTESTINAL ENDOSCOPY         Social History     Socioeconomic History    Marital status:    Tobacco Use    Smoking status: Former     Packs/day: 1.00     Years: 25.00     Pack years: 25.00     Types: Cigarettes     Quit date:      Years since quittin.5    Smokeless tobacco: Never    Tobacco comments:     quit    Substance and Sexual Activity    Alcohol use: Yes     Comment: ocasionally    Drug use: No       OBJECTIVE:     Vital Signs Range (Last 24H):         Significant Labs:  Lab Results   Component Value Date    WBC 8.40 2023    HGB 14.9 2023    HCT 43.4 2023     2023    ALT 44 2022    AST 35 2022     2023    K 4.5 2023     2023    CREATININE 0.93 2023    BUN 11 2023    CO2 22 (L) 2023    INR 1.1 2021       Diagnostic Studies: No relevant studies.    EKG:   Results for orders placed or performed during the hospital encounter of 23   EKG 12-lead    Collection Time: 23 12:00 PM    Narrative    Test Reason : Z01.818,    Vent. Rate : 067 BPM     Atrial Rate : 067 BPM     P-R Int : 142 ms          QRS Dur : 088 ms      QT Int : 390 ms       P-R-T Axes : 059 056 057 degrees     QTc Int : 412 ms    Normal sinus rhythm  Normal ECG  No previous ECGs available  Confirmed by Da Talavera MD ([id number]) on 2023 12:32:10 PM    Referred By: NICOLE HOPKINS           Confirmed By:Da Talavera MD       2D ECHO:  TTE:  No results found for this or any previous  visit.    ALBA:  No results found for this or any previous visit.    ASSESSMENT/PLAN:         Pre-op Assessment    I have reviewed the Patient Summary Reports.     I have reviewed the Nursing Notes. I have reviewed the NPO Status.   I have reviewed the Medications.     Review of Systems  Anesthesia Hx:  No problems with previous Anesthesia  History of prior surgery of interest to airway management or planning: Previous anesthesia: MAC, General Denies Family Hx of Anesthesia complications.   Denies Personal Hx of Anesthesia complications.   Social:  Non-Smoker, No Alcohol Use    Hematology/Oncology:        Denies Current/Recent Cancer   EENT/Dental:   denies chronic allergic rhinitis   Cardiovascular:   Hypertension Denies CAD.    hyperlipidemia    Pulmonary:   Denies COPD.  Denies Asthma.  Denies Sleep Apnea.    Renal/:   Denies Chronic Renal Disease.     Hepatic/GI:   GERD    Musculoskeletal:   Denies Arthritis.     Neurological:   Denies CVA. Denies Seizures.    Psych:   Denies Psychiatric History.          Physical Exam  General: Well nourished, Cooperative, Alert and Oriented    Airway:  Mallampati: III   Mouth Opening: Normal  TM Distance: Normal  Tongue: Normal  Neck ROM: Normal ROM    Dental:  Intact    Chest/Lungs:  Clear to auscultation    Heart:  Rate: Normal  Rhythm: Regular Rhythm        Anesthesia Plan  Type of Anesthesia, risks & benefits discussed:    Anesthesia Type: Gen Natural Airway, MAC, Gen ETT  Intra-op Monitoring Plan: Standard ASA Monitors  Post Op Pain Control Plan: multimodal analgesia  Induction:  IV  Airway Plan: Direct and Video, Post-Induction  Informed Consent: Informed consent signed with the Patient and all parties understand the risks and agree with anesthesia plan.  All questions answered.   ASA Score: 3  Day of Surgery Review of History & Physical: H&P Update referred to the surgeon/provider.    Ready For Surgery From Anesthesia Perspective.     .

## 2023-07-11 ENCOUNTER — ANESTHESIA (OUTPATIENT)
Dept: SURGERY | Facility: HOSPITAL | Age: 63
End: 2023-07-11
Payer: COMMERCIAL

## 2023-07-11 ENCOUNTER — HOSPITAL ENCOUNTER (OUTPATIENT)
Facility: HOSPITAL | Age: 63
Discharge: HOME | End: 2023-07-11
Attending: COLON & RECTAL SURGERY | Admitting: COLON & RECTAL SURGERY
Payer: COMMERCIAL

## 2023-07-11 VITALS
HEART RATE: 61 BPM | TEMPERATURE: 98 F | OXYGEN SATURATION: 97 % | RESPIRATION RATE: 15 BRPM | DIASTOLIC BLOOD PRESSURE: 87 MMHG | BODY MASS INDEX: 30.46 KG/M2 | SYSTOLIC BLOOD PRESSURE: 128 MMHG | WEIGHT: 201 LBS | HEIGHT: 68 IN

## 2023-07-11 DIAGNOSIS — K60.3 PERIANAL FISTULA: ICD-10-CM

## 2023-07-11 DIAGNOSIS — K60.3 ANAL FISTULA: ICD-10-CM

## 2023-07-11 PROCEDURE — 36000706: Performed by: COLON & RECTAL SURGERY

## 2023-07-11 PROCEDURE — 37000008 HC ANESTHESIA 1ST 15 MINUTES: Performed by: COLON & RECTAL SURGERY

## 2023-07-11 PROCEDURE — 37000009 HC ANESTHESIA EA ADD 15 MINS: Performed by: COLON & RECTAL SURGERY

## 2023-07-11 PROCEDURE — D9220A PRA ANESTHESIA: Mod: ,,, | Performed by: ANESTHESIOLOGY

## 2023-07-11 PROCEDURE — 25000003 PHARM REV CODE 250

## 2023-07-11 PROCEDURE — 25000003 PHARM REV CODE 250: Performed by: COLON & RECTAL SURGERY

## 2023-07-11 PROCEDURE — 46285 REMOVE ANAL FIST 2 STAGE: CPT | Mod: ,,, | Performed by: COLON & RECTAL SURGERY

## 2023-07-11 PROCEDURE — 46285 PR REMOVAL ANAL FISTULA,SECOND STAGE: ICD-10-PCS | Mod: ,,, | Performed by: COLON & RECTAL SURGERY

## 2023-07-11 PROCEDURE — 36000707: Performed by: COLON & RECTAL SURGERY

## 2023-07-11 PROCEDURE — 71000015 HC POSTOP RECOV 1ST HR: Performed by: COLON & RECTAL SURGERY

## 2023-07-11 PROCEDURE — 25000003 PHARM REV CODE 250: Performed by: NURSE PRACTITIONER

## 2023-07-11 PROCEDURE — 71000044 HC DOSC ROUTINE RECOVERY FIRST HOUR: Performed by: COLON & RECTAL SURGERY

## 2023-07-11 PROCEDURE — 63600175 PHARM REV CODE 636 W HCPCS: Performed by: COLON & RECTAL SURGERY

## 2023-07-11 PROCEDURE — D9220A PRA ANESTHESIA: ICD-10-PCS | Mod: ,,, | Performed by: ANESTHESIOLOGY

## 2023-07-11 PROCEDURE — 63600175 PHARM REV CODE 636 W HCPCS

## 2023-07-11 RX ORDER — SODIUM CHLORIDE 9 MG/ML
INJECTION, SOLUTION INTRAVENOUS CONTINUOUS
Status: ACTIVE | OUTPATIENT
Start: 2023-07-11

## 2023-07-11 RX ORDER — MIDAZOLAM HYDROCHLORIDE 1 MG/ML
INJECTION INTRAMUSCULAR; INTRAVENOUS
Status: DISCONTINUED | OUTPATIENT
Start: 2023-07-11 | End: 2023-07-11

## 2023-07-11 RX ORDER — IBUPROFEN 800 MG/1
800 TABLET ORAL 3 TIMES DAILY PRN
Qty: 21 TABLET | Refills: 0 | Status: SHIPPED | OUTPATIENT
Start: 2023-07-11 | End: 2023-07-18

## 2023-07-11 RX ORDER — LIDOCAINE HYDROCHLORIDE 20 MG/ML
INJECTION, SOLUTION EPIDURAL; INFILTRATION; INTRACAUDAL; PERINEURAL
Status: DISCONTINUED | OUTPATIENT
Start: 2023-07-11 | End: 2023-07-11

## 2023-07-11 RX ORDER — PROPOFOL 10 MG/ML
VIAL (ML) INTRAVENOUS CONTINUOUS PRN
Status: DISCONTINUED | OUTPATIENT
Start: 2023-07-11 | End: 2023-07-11

## 2023-07-11 RX ORDER — PHENYLEPHRINE HYDROCHLORIDE 10 MG/ML
INJECTION INTRAVENOUS
Status: DISCONTINUED | OUTPATIENT
Start: 2023-07-11 | End: 2023-07-11

## 2023-07-11 RX ORDER — DEXAMETHASONE SODIUM PHOSPHATE 4 MG/ML
INJECTION, SOLUTION INTRA-ARTICULAR; INTRALESIONAL; INTRAMUSCULAR; INTRAVENOUS; SOFT TISSUE
Status: DISCONTINUED | OUTPATIENT
Start: 2023-07-11 | End: 2023-07-11

## 2023-07-11 RX ORDER — BUPIVACAINE HYDROCHLORIDE 2.5 MG/ML
INJECTION, SOLUTION EPIDURAL; INFILTRATION; INTRACAUDAL
Status: DISCONTINUED | OUTPATIENT
Start: 2023-07-11 | End: 2023-07-11 | Stop reason: HOSPADM

## 2023-07-11 RX ORDER — FENTANYL CITRATE 50 UG/ML
INJECTION, SOLUTION INTRAMUSCULAR; INTRAVENOUS
Status: DISCONTINUED | OUTPATIENT
Start: 2023-07-11 | End: 2023-07-11

## 2023-07-11 RX ORDER — OXYCODONE HYDROCHLORIDE 5 MG/1
5 TABLET ORAL EVERY 4 HOURS PRN
Qty: 20 TABLET | Refills: 0 | Status: SHIPPED | OUTPATIENT
Start: 2023-07-11 | End: 2023-08-17

## 2023-07-11 RX ORDER — HYDROMORPHONE HYDROCHLORIDE 1 MG/ML
0.2 INJECTION, SOLUTION INTRAMUSCULAR; INTRAVENOUS; SUBCUTANEOUS EVERY 5 MIN PRN
Status: DISCONTINUED | OUTPATIENT
Start: 2023-07-11 | End: 2023-07-11 | Stop reason: HOSPADM

## 2023-07-11 RX ORDER — PROPOFOL 10 MG/ML
VIAL (ML) INTRAVENOUS
Status: DISCONTINUED | OUTPATIENT
Start: 2023-07-11 | End: 2023-07-11

## 2023-07-11 RX ORDER — METOCLOPRAMIDE HYDROCHLORIDE 5 MG/ML
10 INJECTION INTRAMUSCULAR; INTRAVENOUS EVERY 10 MIN PRN
Status: DISCONTINUED | OUTPATIENT
Start: 2023-07-11 | End: 2023-07-11 | Stop reason: HOSPADM

## 2023-07-11 RX ORDER — SODIUM CHLORIDE 0.9 % (FLUSH) 0.9 %
10 SYRINGE (ML) INJECTION
Status: DISCONTINUED | OUTPATIENT
Start: 2023-07-11 | End: 2023-07-11 | Stop reason: HOSPADM

## 2023-07-11 RX ORDER — ACETAMINOPHEN 500 MG
1000 TABLET ORAL EVERY 8 HOURS
Qty: 60 TABLET | Refills: 2 | Status: SHIPPED | OUTPATIENT
Start: 2023-07-11

## 2023-07-11 RX ORDER — MUPIROCIN 20 MG/G
OINTMENT TOPICAL
Status: DISPENSED | OUTPATIENT
Start: 2023-07-11

## 2023-07-11 RX ADMIN — MIDAZOLAM HYDROCHLORIDE 2 MG: 1 INJECTION INTRAMUSCULAR; INTRAVENOUS at 06:07

## 2023-07-11 RX ADMIN — PHENYLEPHRINE HYDROCHLORIDE 100 MCG: 10 INJECTION INTRAVENOUS at 07:07

## 2023-07-11 RX ADMIN — DEXAMETHASONE SODIUM PHOSPHATE 8 MG: 4 INJECTION, SOLUTION INTRAMUSCULAR; INTRAVENOUS at 07:07

## 2023-07-11 RX ADMIN — FENTANYL CITRATE 25 MCG: 50 INJECTION, SOLUTION INTRAMUSCULAR; INTRAVENOUS at 07:07

## 2023-07-11 RX ADMIN — LIDOCAINE HYDROCHLORIDE 80 MG: 20 INJECTION, SOLUTION EPIDURAL; INFILTRATION; INTRACAUDAL; PERINEURAL at 07:07

## 2023-07-11 RX ADMIN — MUPIROCIN: 20 OINTMENT TOPICAL at 05:07

## 2023-07-11 RX ADMIN — PROPOFOL 20 MG: 10 INJECTION, EMULSION INTRAVENOUS at 07:07

## 2023-07-11 RX ADMIN — PROPOFOL 150 MCG/KG/MIN: 10 INJECTION, EMULSION INTRAVENOUS at 07:07

## 2023-07-11 RX ADMIN — SODIUM CHLORIDE: 0.9 INJECTION, SOLUTION INTRAVENOUS at 07:07

## 2023-07-11 NOTE — PATIENT INSTRUCTIONS
Bleeding is normal, common, and expected following anorectal surgery. Small amounts of bleeding (teaspoon) are normal with bowel movements for the next 1-2 weeks. Large amounts of bleeding (cupfuls) or large clots (handfuls) are an emergency and mean you need to notify our office or proceed to your closest emergency department. Bleeding tends to be heaviest closest to your surgery and tends to improve over time, although day-to-day fluctuations are expected.     Pain is normal for the next few weeks and can be treated with acetaminophen (Tylenol) or your prescribed pain medication (if applicable). It is OK to bathe and shower. Sitting in a hot bathtub (nothing special added to water) for 30 minutes at a time as many times as day as you see fit can help with pain and hygiene. It is the warming action of the water on the anorectal tissues that helps with pain. If it hurts to wipe following a bowel movement, take a bath or shower instead and use the hot water to soothe the area. Be sure not to scald yourself with water that is too hot.     You may eat whatever you want provided you also consume a high fiber diet (lots of fruits, vegetables, and whole wheat grains), drink 8-10 glasses of water daily, plus take a fiber supplement (generic sugar-free Metamucil (psyllium)- one tablespoon mixed in a large glass of water twice daily). Continue Metamucil and the high fiber diet for one month following surgery to give nice soft bowel movements that hurt less as they pass. Metamucil also helps decrease the amount of wiping necessary.     Rarely individuals can have a temporary problem urinating the day of their surgery. If this occurs when you are at home, please notify our office and ask how to proceed with evaluation.     You may resume driving when you can drive safely (i.e. - not be distracted by your pain) and when you are no longer taking opioid pain pills (if applicable).     You may gradually resume physical activity  over the next few days following surgery. Do not perform anything strenuous for the next 3 days (running, weightlifting, cycling, aerobic exercise machines). Gradually resume activity as your pain allows. Let pain be your guide when reintroducing activity. If it hurts - don't do it. As your pain subsides you can gradually reintroduce activity.

## 2023-07-11 NOTE — INTERVAL H&P NOTE
The patient has been examined and the H&P has been reviewed:    I concur with the findings and no changes have occurred since H&P was written.    Surgery risks, benefits and alternative options discussed and understood by patient/family. Consent previously signed in clinic, no antibiotics indicated. Will proceed to OR for exam under anesthesia + seton removal + fistulotomy.    Ty Castillo MD  Colon and Rectal Surgery Fellow    There are no hospital problems to display for this patient.

## 2023-07-11 NOTE — ANESTHESIA POSTPROCEDURE EVALUATION
Anesthesia Post Evaluation    Patient: Jimmy Shaikh    Procedure(s) Performed: Procedure(s) (LRB):  FISTULOTOMY, RECTUM, SUPERFICIAL- seton removal (N/A)    Final Anesthesia Type: general      Patient location during evaluation: PACU  Patient participation: Yes- Able to Participate  Level of consciousness: awake and alert and oriented  Post-procedure vital signs: reviewed and stable  Pain management: adequate  Airway patency: patent    PONV status at discharge: No PONV  Anesthetic complications: no      Cardiovascular status: hemodynamically stable  Respiratory status: unassisted and spontaneous ventilation  Hydration status: euvolemic  Follow-up not needed.          Vitals Value Taken Time   /87 07/11/23 0817   Temp 36.7 °C (98.1 °F) 07/11/23 0815   Pulse 64 07/11/23 0820   Resp 14 07/11/23 0820   SpO2 97 % 07/11/23 0820   Vitals shown include unvalidated device data.      No case tracking events are documented in the log.      Pain/Amparo Score: Amparo Score: 10 (7/11/2023  8:15 AM)

## 2023-07-11 NOTE — OP NOTE
JIMMY SHAIKH  [id number]  [id number]    OPERATIVE NOTE:    DATE OF OPERATION: 07/11/2023    PREOPERATIVE DIAGNOSIS: Anal fistula    POSTOPERATIVE DIAGNOSIS: Anal fistula    PROCEDURE PERFORMED: Superficial fistulotomy. Removal of seton.      ATTENDING SURGEON: Esteban Leung MD    RESIDENT/FELLOW SURGEON: Ty Castillo MD    ANESTHESIA: local/MAC    ESTIMATED BLOOD LOSS: <5 mL    FINDINGS:  1. Superficial anal fistula.    SPECIMENS:   None    COMPLICATIONS:None    DISPOSITION: PACU    CONDITION: good    INDICATION FOR PROCEDURE:  Jimmy Shaikh is a 62 y.o. male with a chronic chronic recurrent anal fistula.  He has had a cutting seton in but for the final stage requires local /MAC.     DESCRIPTION OF PROCEDURE:    After obtaining informed consent the patient was taken the operating room and placed in the supine position.  He underwent IV sedation and then was placed left lateral.  He was prepped in sterile manner.  A preoperative time-out was performed.    In the left lateral position there was a seton in placed.  Using gentle traction after infiltrating the area with local anesthetic the remaining sphincter fibers were divided with electrocautery.  The seton was removed.  The granulation bed was fulgurated.  Two pieces of Gelfoam were placed in the anal canal.  Patient tolerated procedure well was taken recovery room stable condition.    He is discharged home with instructions for wound care, diet, activity, and follow-up.      Esteban Leung MD  , Colon & Rectal Surgery    1

## 2023-07-11 NOTE — BRIEF OP NOTE
[facility] - Surgery ([facility])  Brief Operative Note    Surgery Date: 7/11/2023     Surgeon(s) and Role:     * Esteban Leung MD - Primary     * Ty Castillo MD - Fellow    Assisting Surgeon: None    Pre-op Diagnosis:  Perianal fistula [K60.3]    Post-op Diagnosis:  Post-Op Diagnosis Codes:     * Perianal fistula [K60.3]    Procedure(s) (LRB):  FISTULOTOMY, RECTUM, SUPERFICIAL- seton removal (N/A)    Anesthesia: Choice    Operative Findings: Small superficial perianal fistula    Estimated Blood Loss: 5cc         Specimens:   Specimen (24h ago, onward)      None              Discharge Note    OUTCOME: Patient tolerated treatment/procedure well without complication and is now ready for discharge.    DISPOSITION: Home or Self Care    FINAL DIAGNOSIS:   * Perianal fistula [K60.3]    FOLLOWUP: In clinic    DISCHARGE INSTRUCTIONS:    Discharge Procedure Orders   Diet Adult Regular     Notify your health care provider if you experience any of the following:  increased confusion or weakness     Notify your health care provider if you experience any of the following:  persistent dizziness, light-headedness, or visual disturbances     Notify your health care provider if you experience any of the following:  worsening rash     Notify your health care provider if you experience any of the following:  severe persistent headache     Notify your health care provider if you experience any of the following:  difficulty breathing or increased cough     Notify your health care provider if you experience any of the following:  redness, tenderness, or signs of infection (pain, swelling, redness, odor or green/yellow discharge around incision site)     Notify your health care provider if you experience any of the following:  severe uncontrolled pain     Notify your health care provider if you experience any of the following:  persistent nausea and vomiting or diarrhea     Notify your health care provider if you experience any of  the following:  temperature >100.4     Activity as tolerated

## 2023-07-11 NOTE — TRANSFER OF CARE
"Anesthesia Transfer of Care Note    Patient: Jimmy Shaikh    Procedure(s) Performed: Procedure(s) (LRB):  FISTULOTOMY, RECTUM, SUPERFICIAL- seton removal (N/A)    Patient location: PACU    Transport from OR: Transported from OR on 2-3 L/min O2 by NC with adequate spontaneous ventilation    Post pain: adequate analgesia    Post assessment: no apparent anesthetic complications    Post vital signs: stable    Level of consciousness: awake, alert and oriented    Complications: none    Transfer of care protocol was followed      Last vitals:   Visit Vitals  /74 (BP Location: Right arm, Patient Position: Lying)   Pulse 62   Temp 36.7 °C (98 °F) (Oral)   Resp 18   Ht 5' 8" (1.727 m)   Wt 91.2 kg (201 lb)   SpO2 96%   BMI 30.56 kg/m²     "

## 2023-07-24 ENCOUNTER — PATIENT MESSAGE (OUTPATIENT)
Dept: SURGERY | Facility: CLINIC | Age: 63
End: 2023-07-24
Payer: COMMERCIAL

## 2023-07-25 ENCOUNTER — TELEPHONE (OUTPATIENT)
Dept: SURGERY | Facility: CLINIC | Age: 63
End: 2023-07-25
Payer: COMMERCIAL

## 2023-08-17 ENCOUNTER — OFFICE VISIT (OUTPATIENT)
Dept: SURGERY | Facility: CLINIC | Age: 63
End: 2023-08-17
Payer: COMMERCIAL

## 2023-08-17 VITALS
HEART RATE: 60 BPM | WEIGHT: 203.38 LBS | DIASTOLIC BLOOD PRESSURE: 72 MMHG | HEIGHT: 68 IN | SYSTOLIC BLOOD PRESSURE: 116 MMHG | BODY MASS INDEX: 30.82 KG/M2

## 2023-08-17 DIAGNOSIS — K60.3 PERIANAL FISTULA: ICD-10-CM

## 2023-08-17 DIAGNOSIS — R15.9 FECAL SOILING DUE TO FECAL INCONTINENCE: ICD-10-CM

## 2023-08-17 DIAGNOSIS — Z09 POSTOP CHECK: Primary | ICD-10-CM

## 2023-08-17 PROCEDURE — 3078F PR MOST RECENT DIASTOLIC BLOOD PRESSURE < 80 MM HG: ICD-10-PCS | Mod: CPTII,S$GLB,, | Performed by: COLON & RECTAL SURGERY

## 2023-08-17 PROCEDURE — 3008F BODY MASS INDEX DOCD: CPT | Mod: CPTII,S$GLB,, | Performed by: COLON & RECTAL SURGERY

## 2023-08-17 PROCEDURE — 3074F SYST BP LT 130 MM HG: CPT | Mod: CPTII,S$GLB,, | Performed by: COLON & RECTAL SURGERY

## 2023-08-17 PROCEDURE — 1159F MED LIST DOCD IN RCRD: CPT | Mod: CPTII,S$GLB,, | Performed by: COLON & RECTAL SURGERY

## 2023-08-17 PROCEDURE — 1160F PR REVIEW ALL MEDS BY PRESCRIBER/CLIN PHARMACIST DOCUMENTED: ICD-10-PCS | Mod: CPTII,S$GLB,, | Performed by: COLON & RECTAL SURGERY

## 2023-08-17 PROCEDURE — 3074F PR MOST RECENT SYSTOLIC BLOOD PRESSURE < 130 MM HG: ICD-10-PCS | Mod: CPTII,S$GLB,, | Performed by: COLON & RECTAL SURGERY

## 2023-08-17 PROCEDURE — 99999 PR PBB SHADOW E&M-EST. PATIENT-LVL III: CPT | Mod: PBBFAC,,, | Performed by: COLON & RECTAL SURGERY

## 2023-08-17 PROCEDURE — 4010F PR ACE/ARB THEARPY RXD/TAKEN: ICD-10-PCS | Mod: CPTII,S$GLB,, | Performed by: COLON & RECTAL SURGERY

## 2023-08-17 PROCEDURE — 99024 PR POST-OP FOLLOW-UP VISIT: ICD-10-PCS | Mod: S$GLB,,, | Performed by: COLON & RECTAL SURGERY

## 2023-08-17 PROCEDURE — 4010F ACE/ARB THERAPY RXD/TAKEN: CPT | Mod: CPTII,S$GLB,, | Performed by: COLON & RECTAL SURGERY

## 2023-08-17 PROCEDURE — 99999 PR PBB SHADOW E&M-EST. PATIENT-LVL III: ICD-10-PCS | Mod: PBBFAC,,, | Performed by: COLON & RECTAL SURGERY

## 2023-08-17 PROCEDURE — 3008F PR BODY MASS INDEX (BMI) DOCUMENTED: ICD-10-PCS | Mod: CPTII,S$GLB,, | Performed by: COLON & RECTAL SURGERY

## 2023-08-17 PROCEDURE — 1160F RVW MEDS BY RX/DR IN RCRD: CPT | Mod: CPTII,S$GLB,, | Performed by: COLON & RECTAL SURGERY

## 2023-08-17 PROCEDURE — 3078F DIAST BP <80 MM HG: CPT | Mod: CPTII,S$GLB,, | Performed by: COLON & RECTAL SURGERY

## 2023-08-17 PROCEDURE — 99024 POSTOP FOLLOW-UP VISIT: CPT | Mod: S$GLB,,, | Performed by: COLON & RECTAL SURGERY

## 2023-08-17 PROCEDURE — 1159F PR MEDICATION LIST DOCUMENTED IN MEDICAL RECORD: ICD-10-PCS | Mod: CPTII,S$GLB,, | Performed by: COLON & RECTAL SURGERY

## 2023-08-17 NOTE — PROGRESS NOTES
"CRS Post-operative visit    Visit Info:     Procedure: Superficial fistulotomy. Removal of seton.      Date of Procedure: July 11, 2023    Indication:  62 y.o. male with a chronic chronic recurrent anal fistula.  He has had a cutting seton in but for the final stage requires local /MAC.        Operative Findings:  Superficial anal fistula.    Date of Discharge: July 11, 2023    Current Status: Doing well postoperatively. Fistula tract has closed. Still having some drainage of stool. He experienced some bleeding yesterday. Denies pain. Describes stool as soft and he has been taking 1 tablespoon of miralax a day. Having 2-3 bowel movements a day.     Pathology:   none    Physical Exam:  /72 (BP Location: Left arm, Patient Position: Sitting, BP Method: Large (Automatic))   Pulse 60   Ht 5' 8" (1.727 m)   Wt 92.3 kg (203 lb 6.4 oz)   BMI 30.93 kg/m²     General: White male in NAD   Neuro: aaox4   Respiratory: resps even unlabored  Abdomen: Normal, benign.  Extremities: Warm dry and intact  Anorectal: Fistulotomy site healed.     Assessment:  1. Postop check        2. Perianal fistula        3. Fecal soiling due to fecal incontinence              Plan:  Stop taking miralax  Increase fiber intake, try metamucil (start with 1 tsp a day with room temp water for 1 week, then increase to 2 tsp a day, and eventually increase to 1 tbsp a day). Try this for 6 weeks and send a message in the portal to update on how this is working. If still having issues, RTO.       "

## 2023-09-28 ENCOUNTER — PATIENT MESSAGE (OUTPATIENT)
Dept: SURGERY | Facility: CLINIC | Age: 63
End: 2023-09-28
Payer: COMMERCIAL

## 2024-05-02 ENCOUNTER — OFFICE VISIT (OUTPATIENT)
Dept: SURGERY | Facility: CLINIC | Age: 64
End: 2024-05-02
Payer: COMMERCIAL

## 2024-05-02 VITALS
SYSTOLIC BLOOD PRESSURE: 116 MMHG | HEART RATE: 65 BPM | BODY MASS INDEX: 31.07 KG/M2 | HEIGHT: 68 IN | DIASTOLIC BLOOD PRESSURE: 69 MMHG | WEIGHT: 205 LBS

## 2024-05-02 DIAGNOSIS — M79.18 BUTTOCK PAIN: Primary | ICD-10-CM

## 2024-05-02 PROCEDURE — 3008F BODY MASS INDEX DOCD: CPT | Mod: CPTII,S$GLB,, | Performed by: COLON & RECTAL SURGERY

## 2024-05-02 PROCEDURE — 4010F ACE/ARB THERAPY RXD/TAKEN: CPT | Mod: CPTII,S$GLB,, | Performed by: COLON & RECTAL SURGERY

## 2024-05-02 PROCEDURE — 99213 OFFICE O/P EST LOW 20 MIN: CPT | Mod: S$GLB,,, | Performed by: COLON & RECTAL SURGERY

## 2024-05-02 PROCEDURE — 1159F MED LIST DOCD IN RCRD: CPT | Mod: CPTII,S$GLB,, | Performed by: COLON & RECTAL SURGERY

## 2024-05-02 PROCEDURE — 1160F RVW MEDS BY RX/DR IN RCRD: CPT | Mod: CPTII,S$GLB,, | Performed by: COLON & RECTAL SURGERY

## 2024-05-02 PROCEDURE — 3074F SYST BP LT 130 MM HG: CPT | Mod: CPTII,S$GLB,, | Performed by: COLON & RECTAL SURGERY

## 2024-05-02 PROCEDURE — 3078F DIAST BP <80 MM HG: CPT | Mod: CPTII,S$GLB,, | Performed by: COLON & RECTAL SURGERY

## 2024-05-02 PROCEDURE — 99999 PR PBB SHADOW E&M-EST. PATIENT-LVL III: CPT | Mod: PBBFAC,,, | Performed by: COLON & RECTAL SURGERY

## 2024-05-02 NOTE — PROGRESS NOTES
"Subjective     Patient ID: Jimmy Shaikh is a 63 y.o. male.    Chief Complaint: Rectal Pain    History of Present Illness    REASON FOR VISIT:  Patient presents today for a follow up to discuss new issues with his previous surgical site, specifically a discomfort where the original infection started.    INTERVAL HISTORY:  The patient reports experiencing discomfort at the site of his original infection for the past two weeks, which is high but slightly subsides during the day. He denies any discharge, fever, or issues with bowel movements, which occur 2-3 times daily facilitated by daily Metamucil. The stools are described as soft and formed. The patient also denies any masses, bumps, or lumps at the site of discomfort and any bleeding, pus, mucus, or discharge in the stool. He has a history of a complex transsphincteric fistula for which he underwent multiple operations in 2023, with the last operation being a fistulotomy on July 11, 2023. His last colonoscopy was conducted in 2021 and no polyps or abnormalities were found. The patient's chronic conditions, including high cholesterol, high blood pressure, and acid reflux, are stable. He denies any drainage since his last office visit, smoking, and any abdominal pains, nausea, vomiting, or bloating. His weight has remained stable.    MEDICATIONS:  - Metamucil daily for bowel movement    PAST MEDICAL HISTORY:  - High cholesterol  - High blood pressure  - Acid reflux    FAMILY HISTORY:  - Father: bladder cancer  - Brother: recurrent bladder cancer, regular cystoscopies    SOCIAL HISTORY:  - Non-smoker    ROS:  Constitutional: -fevers  Gastrointestinal: -abdominal pain, -nausea, -vomiting            Objective     /69 (BP Location: Left arm, Patient Position: Sitting, BP Method: Medium (Automatic))   Pulse 65   Ht 5' 8" (1.727 m)   Wt 93 kg (205 lb 0.4 oz)   BMI 31.17 kg/m²   Well appearing  Anorectal: normal perianal skin with rupali scarring.   KENISHA: non " tender no masses  He point to lateral buttock as area of discomfort - there are no skin changes or palpable abnormalities in this area.          Assessment and Plan     1. Buttock pain      Assessment & Plan    K60.3 Anal fistula  K62.89 Other specified diseases of anus and rectum      SYMPTOM MONITORING:  - Discussed the importance of monitoring for changes such as drainage, masses, bumps, lumps, bleeding, pus, or mucus in the stool.     Follow up for worsening of symptoms or persistence more than 1 month.          This note was generated with the assistance of ambient listening technology. Verbal consent was obtained by the patient and accompanying visitor(s) for the recording of patient appointment to facilitate this note. I attest to having reviewed and edited the generated note for accuracy, though some syntax or spelling errors may persist. Please contact the author of this note for any clarification.

## (undated) DEVICE — SEE MEDLINE ITEM 154981

## (undated) DEVICE — TAPE SILK 3IN

## (undated) DEVICE — GAUZE SPONGE 4X4 12PLY

## (undated) DEVICE — TIP YANKAUERS BULB NO VENT

## (undated) DEVICE — TRAY SKIN SCRUB WET PREMIUM

## (undated) DEVICE — TRAY MINOR GEN SURG OMC

## (undated) DEVICE — DRAPE LAP T SHT W/ INSTR PAD

## (undated) DEVICE — BOVIE SUCTION

## (undated) DEVICE — SPONGE GAUZE 16PLY 4X4

## (undated) DEVICE — GOWN SMART IMP BREATHABLE XXLG

## (undated) DEVICE — BRIEFS ADULT SEAMLESS LG/XL

## (undated) DEVICE — SPONGE GELFOAM 12-7MM

## (undated) DEVICE — TRAY MINOR GEN SURG

## (undated) DEVICE — ELECTRODE REM PLYHSV RETURN 9

## (undated) DEVICE — BRIEF MESH LARGE

## (undated) DEVICE — RETRACTOR LONE STAR 14.1X14.1

## (undated) DEVICE — LUBRICANT SURGILUBE 2 OZ

## (undated) DEVICE — HOOK STAY ELAS 5MM 8EA/PK

## (undated) DEVICE — TAPE CURAD SILK ADH 3INX10YD

## (undated) DEVICE — SEE MEDLINE ITEM 157148